# Patient Record
Sex: FEMALE | Race: OTHER | NOT HISPANIC OR LATINO | Employment: OTHER | URBAN - METROPOLITAN AREA
[De-identification: names, ages, dates, MRNs, and addresses within clinical notes are randomized per-mention and may not be internally consistent; named-entity substitution may affect disease eponyms.]

---

## 2024-01-04 PROBLEM — M54.9 CHRONIC BACK PAIN: Status: ACTIVE | Noted: 2024-01-04

## 2024-01-04 PROBLEM — R14.3 FLATUS: Status: RESOLVED | Noted: 2024-01-04 | Resolved: 2024-01-04

## 2024-01-04 PROBLEM — G89.29 CHRONIC BACK PAIN: Status: ACTIVE | Noted: 2024-01-04

## 2024-01-04 PROBLEM — R20.0 NUMBNESS OF FOOT: Status: RESOLVED | Noted: 2023-01-17 | Resolved: 2024-01-04

## 2024-01-04 PROBLEM — F17.219 CIGARETTE NICOTINE DEPENDENCE WITH NICOTINE-INDUCED DISORDER: Status: RESOLVED | Noted: 2024-01-04 | Resolved: 2024-01-04

## 2024-01-04 PROBLEM — M81.0 OSTEOPOROSIS: Status: ACTIVE | Noted: 2022-02-21

## 2024-01-04 PROBLEM — J01.00 ACUTE NON-RECURRENT MAXILLARY SINUSITIS: Status: RESOLVED | Noted: 2024-01-04 | Resolved: 2024-01-04

## 2024-01-04 PROBLEM — K58.9 IRRITABLE BOWEL SYNDROME: Status: ACTIVE | Noted: 2024-01-04

## 2024-01-04 PROBLEM — F41.9 ANXIETY DISORDER: Status: ACTIVE | Noted: 2024-01-04

## 2024-01-04 PROBLEM — M65.30 TRIGGER FINGER OF RIGHT HAND: Status: RESOLVED | Noted: 2024-01-04 | Resolved: 2024-01-04

## 2024-01-04 PROBLEM — M79.672 LEFT FOOT PAIN: Status: RESOLVED | Noted: 2024-01-04 | Resolved: 2024-01-04

## 2024-01-04 PROBLEM — M25.541 ARTHRALGIA OF RIGHT HAND: Status: RESOLVED | Noted: 2024-01-04 | Resolved: 2024-01-04

## 2024-01-04 PROBLEM — G89.29 CHRONIC PAIN: Status: ACTIVE | Noted: 2022-02-09

## 2024-01-04 PROBLEM — R07.89 CHEST WALL PAIN: Status: RESOLVED | Noted: 2024-01-04 | Resolved: 2024-01-04

## 2024-01-04 PROBLEM — R63.0 DECREASED APPETITE: Status: ACTIVE | Noted: 2022-02-09

## 2024-01-04 PROBLEM — F32.A DEPRESSION: Status: ACTIVE | Noted: 2024-01-04

## 2024-01-04 PROBLEM — D49.2 ABNORMAL SKIN GROWTH: Status: RESOLVED | Noted: 2024-01-04 | Resolved: 2024-01-04

## 2024-01-04 PROBLEM — M25.50 JOINT PAIN: Status: ACTIVE | Noted: 2024-01-04

## 2024-01-04 PROBLEM — J31.0 CHRONIC RHINITIS: Status: ACTIVE | Noted: 2024-01-04

## 2024-01-04 PROBLEM — J01.90 ACUTE SINUSITIS: Status: RESOLVED | Noted: 2022-09-27 | Resolved: 2024-01-04

## 2024-01-04 PROBLEM — R53.83 FATIGUE: Status: RESOLVED | Noted: 2022-02-09 | Resolved: 2024-01-04

## 2024-01-04 PROBLEM — E55.9 VITAMIN D DEFICIENCY: Status: ACTIVE | Noted: 2022-02-18

## 2024-01-04 PROBLEM — M32.9 LUPUS (MULTI): Status: ACTIVE | Noted: 2022-02-09

## 2024-01-04 PROBLEM — K57.32 DIVERTICULITIS OF SIGMOID COLON: Status: RESOLVED | Noted: 2024-01-04 | Resolved: 2024-01-04

## 2024-01-04 RX ORDER — TRAMADOL HYDROCHLORIDE 50 MG/1
50 TABLET ORAL EVERY 8 HOURS PRN
COMMUNITY
End: 2024-03-20 | Stop reason: WASHOUT

## 2024-01-04 RX ORDER — GABAPENTIN 300 MG/1
300 CAPSULE ORAL 3 TIMES DAILY
COMMUNITY
End: 2024-03-20 | Stop reason: WASHOUT

## 2024-01-15 ENCOUNTER — APPOINTMENT (OUTPATIENT)
Dept: PAIN MEDICINE | Facility: CLINIC | Age: 69
End: 2024-01-15
Payer: MEDICARE

## 2024-01-24 ENCOUNTER — OFFICE VISIT (OUTPATIENT)
Dept: PAIN MEDICINE | Facility: CLINIC | Age: 69
End: 2024-01-24
Payer: MEDICARE

## 2024-01-24 VITALS
HEART RATE: 99 BPM | RESPIRATION RATE: 22 BRPM | WEIGHT: 140 LBS | DIASTOLIC BLOOD PRESSURE: 68 MMHG | HEIGHT: 62 IN | SYSTOLIC BLOOD PRESSURE: 98 MMHG | BODY MASS INDEX: 25.76 KG/M2

## 2024-01-24 DIAGNOSIS — Z79.899 ENCOUNTER FOR LONG-TERM (CURRENT) USE OF HIGH-RISK MEDICATION: Primary | ICD-10-CM

## 2024-01-24 DIAGNOSIS — M54.16 LUMBAR RADICULOPATHY: ICD-10-CM

## 2024-01-24 PROCEDURE — 1125F AMNT PAIN NOTED PAIN PRSNT: CPT | Performed by: ANESTHESIOLOGY

## 2024-01-24 PROCEDURE — 99204 OFFICE O/P NEW MOD 45 MIN: CPT | Performed by: ANESTHESIOLOGY

## 2024-01-24 PROCEDURE — 99214 OFFICE O/P EST MOD 30 MIN: CPT | Performed by: ANESTHESIOLOGY

## 2024-01-24 PROCEDURE — 1159F MED LIST DOCD IN RCRD: CPT | Performed by: ANESTHESIOLOGY

## 2024-01-24 PROCEDURE — 1036F TOBACCO NON-USER: CPT | Performed by: ANESTHESIOLOGY

## 2024-01-24 PROCEDURE — 82570 ASSAY OF URINE CREATININE: CPT | Mod: 59 | Performed by: ANESTHESIOLOGY

## 2024-01-24 PROCEDURE — 80346 BENZODIAZEPINES1-12: CPT | Mod: MUE | Performed by: ANESTHESIOLOGY

## 2024-01-24 RX ORDER — TRAMADOL HYDROCHLORIDE 50 MG/1
50 TABLET ORAL DAILY PRN
Qty: 30 TABLET | Refills: 1 | Status: SHIPPED | OUTPATIENT
Start: 2024-01-24 | End: 2024-03-20 | Stop reason: SDUPTHER

## 2024-01-24 RX ORDER — PREGABALIN 75 MG/1
75 CAPSULE ORAL 2 TIMES DAILY
Qty: 60 CAPSULE | Refills: 1 | Status: SHIPPED | OUTPATIENT
Start: 2024-01-24 | End: 2024-03-20 | Stop reason: WASHOUT

## 2024-01-24 ASSESSMENT — LIFESTYLE VARIABLES
AUDIT-C TOTAL SCORE: 1
TOTAL SCORE: 0
HOW OFTEN DO YOU HAVE A DRINK CONTAINING ALCOHOL: MONTHLY OR LESS
HOW MANY STANDARD DRINKS CONTAINING ALCOHOL DO YOU HAVE ON A TYPICAL DAY: 1 OR 2
SKIP TO QUESTIONS 9-10: 1
HOW OFTEN DO YOU HAVE SIX OR MORE DRINKS ON ONE OCCASION: NEVER

## 2024-01-24 ASSESSMENT — ENCOUNTER SYMPTOMS
ACTIVITY CHANGE: 1
BACK PAIN: 1

## 2024-01-24 ASSESSMENT — PAIN DESCRIPTION - DESCRIPTORS: DESCRIPTORS: BURNING;NUMBNESS;TINGLING

## 2024-01-24 ASSESSMENT — PAIN - FUNCTIONAL ASSESSMENT: PAIN_FUNCTIONAL_ASSESSMENT: 0-10

## 2024-01-24 ASSESSMENT — PATIENT HEALTH QUESTIONNAIRE - PHQ9
1. LITTLE INTEREST OR PLEASURE IN DOING THINGS: NOT AT ALL
2. FEELING DOWN, DEPRESSED OR HOPELESS: NOT AT ALL
SUM OF ALL RESPONSES TO PHQ9 QUESTIONS 1 & 2: 0

## 2024-01-24 ASSESSMENT — PAIN SCALES - GENERAL
PAINLEVEL_OUTOF10: 8
PAINLEVEL: 8

## 2024-01-24 NOTE — PROGRESS NOTES
Patient is a 68-year-old female with low back and left foot pain.  The pain is predominantly in the left foot.  The pain started after her lumbar fusion surgery from L5 to S1.  The patient has had spinal pain for many years.  The patient underwent a laminectomy by Orlando Jiménez in 2012.  The patient underwent a fusion surgery and a revision in 2022.  The pain is constant.  The pain is worse with activity.  She gets some relief with rest. She has benefited from tramadol in the past.  She requests a refill.  The patient had epidural steroid injections which were ineffective.  These were done by Orlando Jiménez.  We  reviewed the results of the imaging of her lumbar spine.    Review of Systems   Constitutional:  Positive for activity change.   Eyes:  Positive for visual disturbance.   Musculoskeletal:  Positive for back pain and gait problem.   All other systems reviewed and are negative.     GENERAL: alert and appropriate, in no distress, well-hydrated, well nourished, interactive         SKIN: no rash noted         HEAD: normocephalic, no abnormality or lesion noted         EYES: no injection and visual acuity is grossly normal         EARS: external ears normal, no mastoid tenderness         NOSE: external nose normal without rhinorrhea         OROPHARYNX: moist mucus membranes, no tonsillar hypertrophy/exudate, uvula midline and pharynx non-erythematous, lips, teeth and gums are without obvious lesion         NECK: adequate ROM, no cervical LNs noted         RESPIRATORY: breathing non-labored and no grunting/flaring/retractions         CHEST: equal chest rise with normal respiratory effort         ABDOMEN: soft and non-tender         BACK: back normal in appearance, lumbar spine with reduced ROM         EXTREMITIES: strength intact         NEUROLOGIC: gait antalgic, SLR positive, sensation grossly intact    Assessment and Plan    -Chronicity--chronic spinal pain    -Diagnostics--I reviewed the myriad imaging that the  patient brought to the appointment    -Pharmacologic--the patient may benefit from pregabalin.  We discussed the mechanism of action of this medication as well as the side effect profile.  The patient may have a refill of the tramadol.  An opioid agreement was reviewed and signed with the patient. A copy was given to the patient. [place]AR[place] was reviewed and was consistent with the history. A urine or saliva specimen was obtained for toxicology screening. The patient and I discussed the nature of this medication and its side effects. We discussed tolerance, physical dependence, psychological dependence, addiction and opioid-induced hyperalgesia. We discussed the potential need to wean from this medication. We discussed the availability of programs that can help with this process if necessary. We discussed safety issues related to opioids including safe storage. We discussed the fact that the patient should not drive an automobile or operate heavy machinery while taking this medication. A prescription for naloxone was offered to the patient. The patient will be reevaluated for the need to continue opioid therapy in 30-90 days.      -Psychologic--no need for psychologic intervention from my standpoint    -Physical--we discussed the importance of physical therapy and exercise.  We discussed avoidance and modification techniques.    -Intervention--no interventions planned    I spent time educating the patient on the condition including the treatment and the prognosis.  I invited the patient to call at anytime with any questions.

## 2024-01-25 LAB
AMPHETAMINES UR QL SCN: NORMAL
BARBITURATES UR QL SCN: NORMAL
BZE UR QL SCN: NORMAL
CANNABINOIDS UR QL SCN: NORMAL
CREAT UR-MCNC: 88.8 MG/DL (ref 20–320)
PCP UR QL SCN: NORMAL

## 2024-01-28 LAB
1OH-MIDAZOLAM UR CFM-MCNC: <25 NG/ML
6MAM UR CFM-MCNC: <25 NG/ML
7AMINOCLONAZEPAM UR CFM-MCNC: <25 NG/ML
A-OH ALPRAZ UR CFM-MCNC: 70 NG/ML
ALPRAZ UR CFM-MCNC: <25 NG/ML
CHLORDIAZEP UR CFM-MCNC: <25 NG/ML
CLONAZEPAM UR CFM-MCNC: <25 NG/ML
CODEINE UR CFM-MCNC: <50 NG/ML
DIAZEPAM UR CFM-MCNC: <25 NG/ML
EDDP UR CFM-MCNC: <25 NG/ML
FENTANYL UR CFM-MCNC: <2.5 NG/ML
HYDROCODONE CTO UR CFM-MCNC: <25 NG/ML
HYDROMORPHONE UR CFM-MCNC: <25 NG/ML
LORAZEPAM UR CFM-MCNC: <25 NG/ML
METHADONE UR CFM-MCNC: <25 NG/ML
MIDAZOLAM UR CFM-MCNC: <25 NG/ML
MORPHINE UR CFM-MCNC: <50 NG/ML
NORDIAZEPAM UR CFM-MCNC: <25 NG/ML
NORFENTANYL UR CFM-MCNC: <2.5 NG/ML
NORHYDROCODONE UR CFM-MCNC: <25 NG/ML
NOROXYCODONE UR CFM-MCNC: <25 NG/ML
NORTRAMADOL UR-MCNC: <50 NG/ML
OXAZEPAM UR CFM-MCNC: <25 NG/ML
OXYCODONE UR CFM-MCNC: <25 NG/ML
OXYMORPHONE UR CFM-MCNC: <25 NG/ML
TEMAZEPAM UR CFM-MCNC: <25 NG/ML
TRAMADOL UR CFM-MCNC: <50 NG/ML
ZOLPIDEM UR CFM-MCNC: <25 NG/ML
ZOLPIDEM UR-MCNC: <25 NG/ML

## 2024-03-20 ENCOUNTER — OFFICE VISIT (OUTPATIENT)
Dept: PAIN MEDICINE | Facility: CLINIC | Age: 69
End: 2024-03-20
Payer: MEDICARE

## 2024-03-20 VITALS
HEIGHT: 62 IN | SYSTOLIC BLOOD PRESSURE: 106 MMHG | OXYGEN SATURATION: 99 % | BODY MASS INDEX: 25.76 KG/M2 | WEIGHT: 140 LBS | DIASTOLIC BLOOD PRESSURE: 80 MMHG | HEART RATE: 89 BPM

## 2024-03-20 DIAGNOSIS — M54.16 LUMBAR RADICULOPATHY: ICD-10-CM

## 2024-03-20 PROCEDURE — 99214 OFFICE O/P EST MOD 30 MIN: CPT | Performed by: NURSE PRACTITIONER

## 2024-03-20 PROCEDURE — 1159F MED LIST DOCD IN RCRD: CPT | Performed by: NURSE PRACTITIONER

## 2024-03-20 PROCEDURE — 1036F TOBACCO NON-USER: CPT | Performed by: NURSE PRACTITIONER

## 2024-03-20 RX ORDER — TRAMADOL HYDROCHLORIDE 50 MG/1
50 TABLET ORAL EVERY 8 HOURS PRN
Qty: 30 TABLET | Refills: 0 | Status: SHIPPED | OUTPATIENT
Start: 2024-03-20 | End: 2024-03-21 | Stop reason: SDUPTHER

## 2024-03-20 ASSESSMENT — PAIN SCALES - GENERAL
PAINLEVEL_OUTOF10: 5 - MODERATE PAIN
PAINLEVEL: 5

## 2024-03-20 ASSESSMENT — PATIENT HEALTH QUESTIONNAIRE - PHQ9
SUM OF ALL RESPONSES TO PHQ9 QUESTIONS 1 AND 2: 0
2. FEELING DOWN, DEPRESSED OR HOPELESS: NOT AT ALL
1. LITTLE INTEREST OR PLEASURE IN DOING THINGS: NOT AT ALL

## 2024-03-20 ASSESSMENT — PAIN DESCRIPTION - DESCRIPTORS: DESCRIPTORS: ACHING

## 2024-03-20 ASSESSMENT — PAIN - FUNCTIONAL ASSESSMENT: PAIN_FUNCTIONAL_ASSESSMENT: 0-10

## 2024-03-20 NOTE — PROGRESS NOTES
Subjective   Patient ID: Diamond Diaz is a 68 y.o. female who presents for Back Pain (Left foot).    HPI 68-year-old female with past medical history significant for osteoporosis, SLE, polyarthralgia, IBS, depression, chronic back pain, anxiety disorder low back and left foot pain. She reports the pain started after her L5-S1 lumbar fusion surgery. She also underwent a laminectomy in 2012. She reports a fusion surgery and a revision in 2022.  She reports that she has had multiple epidural steroid injections that were ineffective as well as other treatment modalities.  She saw Dr. Kirstin parsons in January who provided her with a prescription for Pregabalin and Tramadol. She reports that she took Pregabalin for 4-5 days. She reports excessive thirst, confusion, impaired balance and constipation. She could not tolerate these symptoms and therefore stopped. She reports the Tramadol does help but the once daily dosing is not helping much. She has to pick and choose what half of the day she is miserable and which half of the days she has tolerable pain.  She is quite tearful when conversing with me.  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  She reports that she used to ride motorcycles and be very active outside with chopping wood and other things that really made her happy and ever since she had her back surgery she has been significantly limited in her physical ability.  She reports that she has a dog she loves and wants to take on frequent walks and she does push herself to do so but then she is miserable afterward.    Review of Systems Unless noted in the HPI all other systems have been reviewed and are negative for complaint    Objective   Physical Exam  General- No acute distress, well appearing and well nourished.   Eyes Conjunctiva and lids: No erythema, swelling or discharge  Neck - Supple, no cervical  lymphadenopathy.   Pulmonary - Respiratory effort: Normal respiration.   Cardiovascular - Normal rate and rhythm.  Examination of extremities for edema and/or varicosities: No peripheral edema  Abdomen: Soft, Non-tender, non-distended, no abdominal masses.   Musculoskeletal - Lumbar spine with reduced ROM. LLE with significantly decreased ROM.    Skin - Skin and subcutaneous tissue: Normal without rashes or lesions.  Neurologic -Severely antalgic gait; has to stop every 4-5 steps. SLR positive. Hypersensitivity to the LLE  Psychiatric - Orientation to person, place, and time: Normal. Mood and affect: Normal.    Assessment/Plan   Problem List Items Addressed This Visit    None  Visit Diagnoses       Lumbar radiculopathy        Relevant Medications    traMADol (Ultram) 50 mg tablet          TREATMENT PLAN:  I had a nice discussion with the patient today and our plan will be as follows:  Radiology: No new imaging to review at this time.   Physically: Encouraged patient to continue with increased physical activity as able.   Psychologically: No acute psychological needs at this time.    Medication: Discussed with collaborating physician Dr. Fulton and will increase patient's Tramadol to 50mg 3 times daily, as needed.  I will refill the patient's opioids today for [ 1 ] month. The patient continues to see benefit and improvement in their quality of life and ability to maintain ADLs. Patient educated about the risks of taking opioids and operating a motor vehicle. Patient reports no adverse side effects to current medication regimen.  Current regimen does allow patient to maintain ADLs.  Patient reports no new neurologic symptoms, new pain areas, or exacerbation in pain today.  Patient reports they are happy with current treatment care path.  Patient has been educated on the risks, benefits, and alternatives of controlled substances as well as the proper way to store these medications. The patient and I discussed the  nature of this medication and its side effects.  We discussed tolerance, physical dependence, psychological dependence, addiction and opioid-induced hyperalgesia.  We discussed the potential need to wean from this medication.  We discussed the availability of programs that can help with this process if necessary.  We discussed safety issues related to opioids including safe storage.  We discussed the fact that the patient should not drive an automobile or operate heavy machinery while taking this medication.  A prescription for naloxone was offered to the patient.  The patient will be re-evaluated for the need to continue opioid therapy in 60-90 days.  A PDMP report was reviewed today and was consistent with reported prescribing.  Toxicology screening results were reviewed.  Patient did not bring pills with her today for her appointment.  Informed patient that bringing her pills to the next appointment would be appreciated.  Duration: Chronic/ongoing.  Intervention: Will see patient back in 1 month to reassess change in medication dosage/frequency.

## 2024-03-21 ENCOUNTER — TELEPHONE (OUTPATIENT)
Dept: PAIN MEDICINE | Facility: CLINIC | Age: 69
End: 2024-03-21
Payer: MEDICARE

## 2024-03-21 DIAGNOSIS — M54.16 LUMBAR RADICULOPATHY: ICD-10-CM

## 2024-03-21 RX ORDER — TRAMADOL HYDROCHLORIDE 50 MG/1
50 TABLET ORAL EVERY 8 HOURS PRN
Qty: 90 TABLET | Refills: 0 | Status: SHIPPED | OUTPATIENT
Start: 2024-03-29 | End: 2024-04-23 | Stop reason: SDUPTHER

## 2024-04-20 ENCOUNTER — LAB REQUISITION (OUTPATIENT)
Dept: LAB | Facility: HOSPITAL | Age: 69
End: 2024-04-20
Payer: MEDICARE

## 2024-04-20 DIAGNOSIS — R30.0 DYSURIA: ICD-10-CM

## 2024-04-20 PROCEDURE — 87086 URINE CULTURE/COLONY COUNT: CPT

## 2024-04-22 LAB — BACTERIA UR CULT: NORMAL

## 2024-04-23 ENCOUNTER — OFFICE VISIT (OUTPATIENT)
Dept: PAIN MEDICINE | Facility: CLINIC | Age: 69
End: 2024-04-23
Payer: MEDICARE

## 2024-04-23 VITALS
HEIGHT: 62 IN | HEART RATE: 90 BPM | SYSTOLIC BLOOD PRESSURE: 100 MMHG | OXYGEN SATURATION: 95 % | WEIGHT: 140 LBS | DIASTOLIC BLOOD PRESSURE: 70 MMHG | BODY MASS INDEX: 25.76 KG/M2

## 2024-04-23 DIAGNOSIS — M54.16 LUMBAR RADICULOPATHY: Primary | ICD-10-CM

## 2024-04-23 PROCEDURE — 1036F TOBACCO NON-USER: CPT | Performed by: NURSE PRACTITIONER

## 2024-04-23 PROCEDURE — 99214 OFFICE O/P EST MOD 30 MIN: CPT | Performed by: NURSE PRACTITIONER

## 2024-04-23 PROCEDURE — 1125F AMNT PAIN NOTED PAIN PRSNT: CPT | Performed by: NURSE PRACTITIONER

## 2024-04-23 PROCEDURE — 1159F MED LIST DOCD IN RCRD: CPT | Performed by: NURSE PRACTITIONER

## 2024-04-23 RX ORDER — TRAMADOL HYDROCHLORIDE 50 MG/1
50 TABLET ORAL EVERY 8 HOURS PRN
Qty: 90 TABLET | Refills: 1 | Status: SHIPPED | OUTPATIENT
Start: 2024-05-02 | End: 2024-05-23 | Stop reason: SDUPTHER

## 2024-04-23 ASSESSMENT — PAIN - FUNCTIONAL ASSESSMENT: PAIN_FUNCTIONAL_ASSESSMENT: 0-10

## 2024-04-23 ASSESSMENT — PAIN SCALES - GENERAL
PAINLEVEL: 2
PAINLEVEL_OUTOF10: 2

## 2024-04-23 ASSESSMENT — PATIENT HEALTH QUESTIONNAIRE - PHQ9
2. FEELING DOWN, DEPRESSED OR HOPELESS: NOT AT ALL
SUM OF ALL RESPONSES TO PHQ9 QUESTIONS 1 AND 2: 0
1. LITTLE INTEREST OR PLEASURE IN DOING THINGS: NOT AT ALL

## 2024-04-23 ASSESSMENT — PAIN DESCRIPTION - DESCRIPTORS: DESCRIPTORS: ACHING

## 2024-04-23 NOTE — PROGRESS NOTES
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

## 2024-04-23 NOTE — PROGRESS NOTES
Subjective   Patient ID: Diamond Diaz is a 68 y.o. female who presents for Pain Management.    HPI 68-year-old female with past medical history significant for osteoporosis, SLE, polyarthralgia, IBS, depression, chronic back pain, anxiety disorder low back and left foot pain. She reports the pain started after her L5-S1 lumbar fusion surgery. She also underwent a laminectomy in 2012. She reports a fusion surgery and a revision in 2022.  She reports that she has had multiple epidural steroid injections that were ineffective as well as other treatment modalities. Diamond presents for a Pain Management follow-up and Medication refill. Last visit, we increased her Tramadol to 50mg TID, PRN as her quality of life with previous dosing was unacceptable. Today, she reports she has better been able to keep up with her ADLs as well as have some improved quality of life.     Review of Systems Unless noted in the HPI all other systems have been reviewed and are negative for complaint.     Objective   Physical Exam  General- No acute distress, well appearing and well nourished.   Eyes Conjunctiva and lids: No erythema, swelling or discharge  Neck - Supple, no cervical lymphadenopathy.   Pulmonary - Respiratory effort: Normal respiration.   Cardiovascular - Normal rate and rhythm.  Examination of extremities for edema and/or varicosities: No peripheral edema  Abdomen: Soft, Non-tender, non-distended, no abdominal masses.   Musculoskeletal - Lumbar spine with reduced ROM. LLE with significantly decreased ROM.    Skin - Skin and subcutaneous tissue: Normal without rashes or lesions.  Neurologic -Severely antalgic gait; has to stop every 4-5 steps. SLR positive. Hypersensitivity to the LLE  Psychiatric - Orientation to person, place, and time: Normal. Mood and affect: Normal.    Assessment/Plan   Problem List Items Addressed This Visit       Lumbar radiculopathy - Primary    Relevant Medications    traMADol (Ultram) 50 mg tablet  (Start on 5/2/2024)     TREATMENT PLAN:  I had a nice discussion with the patient today and our plan will be as follows:  Radiology: No new imaging to review at this time.   Physically: Encouraged patient to continue with increased physical activity as able.   Psychologically: No acute psychological needs at this time.    Medication: I will refill the patient's opioids today for [ 3 ] months. The patient continues to see benefit and improvement in their quality of life and ability to maintain ADLs. Patient educated about the risks of taking opioids and operating a motor vehicle. Patient reports no adverse side effects to current medication regimen.  Current regimen does allow patient to maintain ADLs.  Patient reports no new neurologic symptoms, new pain areas, or exacerbation in pain today.  Patient reports they are happy with current treatment care path. Patient has been educated on the risks, benefits, and alternatives of controlled substances as well as the proper way to store these medications. The patient and I discussed the nature of this medication and its side effects.  We discussed tolerance, physical dependence, psychological dependence, addiction and opioid-induced hyperalgesia.  We discussed the potential need to wean from this medication.  We discussed the availability of programs that can help with this process if necessary.  We discussed safety issues related to opioids including safe storage.  We discussed the fact that the patient should not drive an automobile or operate heavy machinery while taking this medication.  A prescription for naloxone was offered to the patient.  The patient will be re-evaluated for the need to continue opioid therapy in 60-90 days.  A PDMP report was reviewed today and was consistent with reported prescribing.  Tox screen is up-to-date and consistent with prescribing history.  Duration: Chronic/ongoing.    Intervention: Nothing at this time.

## 2024-05-22 DIAGNOSIS — M54.16 LUMBAR RADICULOPATHY: ICD-10-CM

## 2024-05-23 RX ORDER — TRAMADOL HYDROCHLORIDE 50 MG/1
50 TABLET ORAL EVERY 8 HOURS PRN
Qty: 90 TABLET | Refills: 1 | Status: SHIPPED | OUTPATIENT
Start: 2024-05-23 | End: 2024-06-22

## 2024-05-23 NOTE — TELEPHONE ENCOUNTER
Need to resend new Tramadol prescription.. Script that was written dated 4/23/24, start date 5/2/24 was not picked up at the pharmacy.

## 2024-05-31 ENCOUNTER — TELEPHONE (OUTPATIENT)
Dept: OBSTETRICS AND GYNECOLOGY | Facility: CLINIC | Age: 69
End: 2024-05-31
Payer: MEDICARE

## 2024-05-31 NOTE — TELEPHONE ENCOUNTER
NP calling with c/o urinary pressure and frequency w/ hematuria but negative for UTI. Pt was seen by urgent care, ED and urologist. Pt states she feels constant pressure on her bladder and burning. Appt made for 06/05.

## 2024-06-03 ENCOUNTER — OFFICE VISIT (OUTPATIENT)
Dept: PRIMARY CARE | Facility: CLINIC | Age: 69
End: 2024-06-03
Payer: MEDICARE

## 2024-06-03 VITALS
BODY MASS INDEX: 23.19 KG/M2 | OXYGEN SATURATION: 97 % | HEART RATE: 78 BPM | WEIGHT: 126 LBS | SYSTOLIC BLOOD PRESSURE: 106 MMHG | HEIGHT: 62 IN | DIASTOLIC BLOOD PRESSURE: 74 MMHG

## 2024-06-03 DIAGNOSIS — E55.9 VITAMIN D DEFICIENCY: ICD-10-CM

## 2024-06-03 DIAGNOSIS — F32.A DEPRESSION, UNSPECIFIED DEPRESSION TYPE: ICD-10-CM

## 2024-06-03 DIAGNOSIS — F41.9 ANXIETY DISORDER, UNSPECIFIED TYPE: ICD-10-CM

## 2024-06-03 DIAGNOSIS — Z12.31 ENCOUNTER FOR SCREENING MAMMOGRAM FOR BREAST CANCER: ICD-10-CM

## 2024-06-03 DIAGNOSIS — E78.00 SERUM CHOLESTEROL ELEVATED: ICD-10-CM

## 2024-06-03 DIAGNOSIS — M81.0 OSTEOPOROSIS, UNSPECIFIED OSTEOPOROSIS TYPE, UNSPECIFIED PATHOLOGICAL FRACTURE PRESENCE: ICD-10-CM

## 2024-06-03 DIAGNOSIS — R31.29 MICROSCOPIC HEMATURIA: ICD-10-CM

## 2024-06-03 DIAGNOSIS — B37.31 YEAST VAGINITIS: ICD-10-CM

## 2024-06-03 DIAGNOSIS — Z87.891 FORMER SMOKER: ICD-10-CM

## 2024-06-03 DIAGNOSIS — Z00.00 ROUTINE GENERAL MEDICAL EXAMINATION AT HEALTH CARE FACILITY: Primary | ICD-10-CM

## 2024-06-03 PROCEDURE — 1123F ACP DISCUSS/DSCN MKR DOCD: CPT | Performed by: PHYSICIAN ASSISTANT

## 2024-06-03 PROCEDURE — 1158F ADVNC CARE PLAN TLK DOCD: CPT | Performed by: PHYSICIAN ASSISTANT

## 2024-06-03 PROCEDURE — 1160F RVW MEDS BY RX/DR IN RCRD: CPT | Performed by: PHYSICIAN ASSISTANT

## 2024-06-03 PROCEDURE — 1159F MED LIST DOCD IN RCRD: CPT | Performed by: PHYSICIAN ASSISTANT

## 2024-06-03 PROCEDURE — G0439 PPPS, SUBSEQ VISIT: HCPCS | Performed by: PHYSICIAN ASSISTANT

## 2024-06-03 PROCEDURE — 1170F FXNL STATUS ASSESSED: CPT | Performed by: PHYSICIAN ASSISTANT

## 2024-06-03 RX ORDER — CITALOPRAM 10 MG/1
10 TABLET ORAL DAILY
Qty: 30 TABLET | Refills: 1 | Status: SHIPPED | OUTPATIENT
Start: 2024-06-03 | End: 2024-08-02

## 2024-06-03 RX ORDER — FLUCONAZOLE 150 MG/1
150 TABLET ORAL ONCE
Qty: 1 TABLET | Refills: 0 | Status: SHIPPED | OUTPATIENT
Start: 2024-06-03 | End: 2024-06-03

## 2024-06-03 ASSESSMENT — ENCOUNTER SYMPTOMS
DIARRHEA: 0
ABDOMINAL PAIN: 0
WHEEZING: 0
BLOOD IN STOOL: 0
CHEST TIGHTNESS: 0
ACTIVITY CHANGE: 0
FREQUENCY: 1
APPETITE CHANGE: 0
PALPITATIONS: 0
DIZZINESS: 0
CONSTIPATION: 0
SLEEP DISTURBANCE: 0
LIGHT-HEADEDNESS: 0
ARTHRALGIAS: 0
TREMORS: 0
SHORTNESS OF BREATH: 0
COLOR CHANGE: 0
MYALGIAS: 0
NAUSEA: 0

## 2024-06-03 ASSESSMENT — ACTIVITIES OF DAILY LIVING (ADL)
DRESSING: INDEPENDENT
DOING_HOUSEWORK: INDEPENDENT
BATHING: INDEPENDENT
MANAGING_FINANCES: INDEPENDENT
TAKING_MEDICATION: INDEPENDENT
GROCERY_SHOPPING: INDEPENDENT

## 2024-06-03 ASSESSMENT — PATIENT HEALTH QUESTIONNAIRE - PHQ9
1. LITTLE INTEREST OR PLEASURE IN DOING THINGS: SEVERAL DAYS
10. IF YOU CHECKED OFF ANY PROBLEMS, HOW DIFFICULT HAVE THESE PROBLEMS MADE IT FOR YOU TO DO YOUR WORK, TAKE CARE OF THINGS AT HOME, OR GET ALONG WITH OTHER PEOPLE: SOMEWHAT DIFFICULT
SUM OF ALL RESPONSES TO PHQ9 QUESTIONS 1 AND 2: 2
2. FEELING DOWN, DEPRESSED OR HOPELESS: SEVERAL DAYS

## 2024-06-03 NOTE — PROGRESS NOTES
"Subjective   Reason for Visit: Diamond Diaz is an 68 y.o. female here for a Medicare Wellness visit.     Past Medical, Surgical, and Family History reviewed and updated in chart.         Urinary Frequency   This is a chronic problem. The current episode started more than 1 month ago. The problem has been waxing and waning. The pain is mild. There has been no fever. Associated symptoms include frequency. Pertinent negatives include no nausea or urgency.   Depression  Visit Type: follow-up  Patient presents with the following symptoms: nervousness/anxiety.  Patient is not experiencing: irritability, nausea, palpitations, shortness of breath, suicidal ideas and suicidal planning.          Patient Care Team:  Bisi Carlos PA-C as PCP - General (Family Medicine)     Review of Systems   Constitutional:  Negative for activity change, appetite change and irritability.   HENT:  Negative for dental problem.    Eyes:  Negative for visual disturbance.   Respiratory:  Negative for chest tightness, shortness of breath and wheezing.    Cardiovascular:  Negative for chest pain, palpitations and leg swelling.   Gastrointestinal:  Negative for abdominal pain, blood in stool, constipation, diarrhea and nausea.   Endocrine: Negative for cold intolerance and heat intolerance.   Genitourinary:  Positive for frequency. Negative for urgency.   Musculoskeletal:  Negative for arthralgias and myalgias.   Skin:  Negative for color change.   Allergic/Immunologic: Negative for immunocompromised state.   Neurological:  Negative for dizziness, tremors and light-headedness.   Psychiatric/Behavioral:  Positive for depression. Negative for behavioral problems, sleep disturbance and suicidal ideas. The patient is nervous/anxious.        Objective   Vitals:  /74   Pulse 78   Ht 1.575 m (5' 2\")   Wt 57.2 kg (126 lb)   SpO2 97%   BMI 23.05 kg/m²       Physical Exam  HENT:      Right Ear: Tympanic membrane normal.      Left Ear: " Tympanic membrane normal.      Nose: Nose normal.      Mouth/Throat:      Mouth: Mucous membranes are moist.   Cardiovascular:      Rate and Rhythm: Normal rate and regular rhythm.      Pulses: Normal pulses.   Pulmonary:      Effort: Pulmonary effort is normal. No respiratory distress.   Abdominal:      General: Bowel sounds are normal.      Tenderness: There is no abdominal tenderness.   Genitourinary:     Labia:         Right: No rash.         Left: No rash.       Urethra: No prolapse.      Vagina: Vaginal discharge and erythema present. No bleeding.   Musculoskeletal:      Cervical back: Normal range of motion. No tenderness.      Right lower leg: No edema.      Left lower leg: No edema.   Skin:     General: Skin is warm.   Neurological:      General: No focal deficit present.      Mental Status: She is alert. Mental status is at baseline.   Psychiatric:         Mood and Affect: Mood normal.         Thought Content: Thought content normal.         Judgment: Judgment normal.         Assessment/Plan   Problem List Items Addressed This Visit       Vitamin D deficiency    Relevant Orders    Vitamin D 25-Hydroxy,Total (for eval of Vitamin D levels)    Osteoporosis    Relevant Orders    XR DEXA bone density    Depression    Anxiety disorder    Relevant Medications    citalopram (CeleXA) 10 mg tablet     Other Visit Diagnoses       Routine general medical examination at health care facility    -  Primary    Former smoker        Relevant Orders    CT lung screening low dose    Serum cholesterol elevated        Relevant Orders    Lipid Panel    TSH with reflex to Free T4 if abnormal    Encounter for screening mammogram for breast cancer        Relevant Orders    BI mammo bilateral screening tomosynthesis    Microscopic hematuria        Yeast vaginitis            Will treat for yeast. Discussed taking probiotic and using lubrication daily as well.

## 2024-06-04 ASSESSMENT — ENCOUNTER SYMPTOMS
NERVOUS/ANXIOUS: 1
DEPRESSION: 1
IRRITABILITY: 0

## 2024-06-05 ENCOUNTER — OFFICE VISIT (OUTPATIENT)
Dept: OBSTETRICS AND GYNECOLOGY | Facility: CLINIC | Age: 69
End: 2024-06-05
Payer: MEDICARE

## 2024-06-05 VITALS — DIASTOLIC BLOOD PRESSURE: 78 MMHG | WEIGHT: 127.4 LBS | SYSTOLIC BLOOD PRESSURE: 100 MMHG | BODY MASS INDEX: 23.3 KG/M2

## 2024-06-05 DIAGNOSIS — N95.2 ATROPHIC VAGINITIS: Primary | ICD-10-CM

## 2024-06-05 DIAGNOSIS — N76.0 VULVOVAGINITIS: ICD-10-CM

## 2024-06-05 DIAGNOSIS — R39.89 SENSATION OF PRESSURE IN BLADDER AREA: ICD-10-CM

## 2024-06-05 LAB
POC APPEARANCE, URINE: CLEAR
POC BILIRUBIN, URINE: NEGATIVE
POC BLOOD, URINE: NEGATIVE
POC COLOR, URINE: YELLOW
POC GLUCOSE, URINE: NEGATIVE MG/DL
POC KETONES, URINE: NEGATIVE MG/DL
POC LEUKOCYTES, URINE: NEGATIVE
POC NITRITE,URINE: NEGATIVE
POC PH, URINE: 5.5 PH
POC PROTEIN, URINE: NEGATIVE MG/DL
POC SPECIFIC GRAVITY, URINE: <=1.005
POC UROBILINOGEN, URINE: 0.2 EU/DL

## 2024-06-05 PROCEDURE — 81003 URINALYSIS AUTO W/O SCOPE: CPT | Performed by: OBSTETRICS & GYNECOLOGY

## 2024-06-05 PROCEDURE — 1159F MED LIST DOCD IN RCRD: CPT | Performed by: OBSTETRICS & GYNECOLOGY

## 2024-06-05 PROCEDURE — 87205 SMEAR GRAM STAIN: CPT | Mod: WESLAB | Performed by: OBSTETRICS & GYNECOLOGY

## 2024-06-05 PROCEDURE — 1123F ACP DISCUSS/DSCN MKR DOCD: CPT | Performed by: OBSTETRICS & GYNECOLOGY

## 2024-06-05 PROCEDURE — 1125F AMNT PAIN NOTED PAIN PRSNT: CPT | Performed by: OBSTETRICS & GYNECOLOGY

## 2024-06-05 PROCEDURE — 99204 OFFICE O/P NEW MOD 45 MIN: CPT | Performed by: OBSTETRICS & GYNECOLOGY

## 2024-06-05 RX ORDER — NYSTATIN 100000 U/G
OINTMENT TOPICAL 2 TIMES DAILY
Qty: 30 G | Refills: 0 | Status: SHIPPED | OUTPATIENT
Start: 2024-06-05 | End: 2025-06-05

## 2024-06-05 RX ORDER — TRIAMCINOLONE ACETONIDE 1 MG/G
OINTMENT TOPICAL 2 TIMES DAILY
Qty: 30 G | Refills: 0 | Status: SHIPPED | OUTPATIENT
Start: 2024-06-05

## 2024-06-05 RX ORDER — METRONIDAZOLE 7.5 MG/G
GEL VAGINAL DAILY
Qty: 70 G | Refills: 0 | Status: SHIPPED | OUTPATIENT
Start: 2024-06-05 | End: 2024-06-10

## 2024-06-05 ASSESSMENT — LIFESTYLE VARIABLES
HOW MANY STANDARD DRINKS CONTAINING ALCOHOL DO YOU HAVE ON A TYPICAL DAY: 1 OR 2
HOW OFTEN DO YOU HAVE A DRINK CONTAINING ALCOHOL: MONTHLY OR LESS
SKIP TO QUESTIONS 9-10: 0
AUDIT-C TOTAL SCORE: 2
HOW OFTEN DO YOU HAVE SIX OR MORE DRINKS ON ONE OCCASION: LESS THAN MONTHLY

## 2024-06-05 ASSESSMENT — PATIENT HEALTH QUESTIONNAIRE - PHQ9
SUM OF ALL RESPONSES TO PHQ9 QUESTIONS 1 & 2: 0
SUM OF ALL RESPONSES TO PHQ9 QUESTIONS 1 & 2: 0
2. FEELING DOWN, DEPRESSED OR HOPELESS: NOT AT ALL
1. LITTLE INTEREST OR PLEASURE IN DOING THINGS: NOT AT ALL
2. FEELING DOWN, DEPRESSED OR HOPELESS: NOT AT ALL
1. LITTLE INTEREST OR PLEASURE IN DOING THINGS: NOT AT ALL

## 2024-06-05 ASSESSMENT — PAIN SCALES - GENERAL: PAINLEVEL: 2

## 2024-06-05 ASSESSMENT — ENCOUNTER SYMPTOMS
OCCASIONAL FEELINGS OF UNSTEADINESS: 0
LOSS OF SENSATION IN FEET: 0
DEPRESSION: 0

## 2024-06-05 NOTE — PROGRESS NOTES
Annual-Hysterectomy  Subjective   Diamond Diaz is a 68 y.o. female who is here for a routine exam.   Complaints:   increased pressure/pain in vaginal vault, increased feelings like she had to go to restroom, +microscopic blood noted, tx'd w/macrobid and keflex, w/o relief; burning after voiding; monistat didn't help; diflucan helped some, but not gone; had 1 delivery 7lbs; had vaginal hysterectomy done 97 due to bleeding, was told to get paps after removal but has not, not due to abnl cells, never had abnl paps prior and tissue was normal at removal per pt  Periods: none    History of abnormal Pap smear: no  History of abnormal mammogram: no      OB History          1    Para   1    Term   1       0    AB   0    Living   1         SAB   0    IAB   0    Ectopic   0    Multiple   0    Live Births   1                  Review of Systems   Genitourinary:  Positive for dysuria, pelvic pain and vaginal discharge.         Objective   /78   Wt 57.8 kg (127 lb 6.4 oz)   BMI 23.30 kg/m²        General:   Alert and oriented, in no acute distress   Neck: Supple. No visible thyromegaly.    Breast/Axilla: Normal to palpation bilaterally without masses, skin changes, or nipple discharge.    Abdomen: Soft, non-tender, without masses or organomegaly   Vulva: Normal architecture without erythema, masses, or lesions.    Vagina: Normal mucosa without lesions, masses; + atrophy. white vaginal discharge.    Cervix: absent   Uterus: absent   Adnexa: Normal without masses or lesions   Pelvic Floor Grade 1-2 cystocele noted. No high tone pelvic floor    Psych Normal affect. Normal mood.      Assessment/Plan   Problem List Items Addressed This Visit             ICD-10-CM    Sensation of pressure in bladder area R39.89    Relevant Orders    POCT UA Automated manually resulted (Completed)     Other Visit Diagnoses         Codes    Atrophic vaginitis    -  Primary N95.2    Relevant Medications    metroNIDAZOLE  (Metrogel) 0.75 % (37.5mg/5 gram) vaginal gel    Other Relevant Orders    Vaginitis Gram Stain For Bacterial Vaginosis + Yeast    Vulvovaginitis     N76.0    Relevant Medications    nystatin (Mycostatin) ointment    triamcinolone (Kenalog) 0.1 % ointment    Other Relevant Orders    Vaginitis Gram Stain For Bacterial Vaginosis + Yeast                Nathanael Altamirano MD

## 2024-06-07 LAB
BACTERIAL VAGINOSIS VAG-IMP: NORMAL
CLUE CELLS VAG LPF-#/AREA: NORMAL /[LPF]
NUGENT SCORE: 4
YEAST VAG WET PREP-#/AREA: NORMAL

## 2024-06-07 ASSESSMENT — ENCOUNTER SYMPTOMS: DYSURIA: 1

## 2024-06-21 ENCOUNTER — LAB (OUTPATIENT)
Dept: LAB | Facility: LAB | Age: 69
End: 2024-06-21
Payer: MEDICARE

## 2024-06-21 DIAGNOSIS — E55.9 VITAMIN D DEFICIENCY: ICD-10-CM

## 2024-06-21 DIAGNOSIS — E78.00 SERUM CHOLESTEROL ELEVATED: ICD-10-CM

## 2024-06-21 LAB
25(OH)D3 SERPL-MCNC: 29 NG/ML (ref 30–100)
CHOLEST SERPL-MCNC: 248 MG/DL (ref 0–199)
CHOLESTEROL/HDL RATIO: 4.3
HDLC SERPL-MCNC: 58 MG/DL
LDLC SERPL CALC-MCNC: 160 MG/DL
NON HDL CHOLESTEROL: 190 MG/DL (ref 0–149)
T4 FREE SERPL-MCNC: 1.2 NG/DL (ref 0.78–1.48)
TRIGL SERPL-MCNC: 149 MG/DL (ref 0–149)
TSH SERPL-ACNC: 7.43 MIU/L (ref 0.44–3.98)
VLDL: 30 MG/DL (ref 0–40)

## 2024-06-21 PROCEDURE — 84443 ASSAY THYROID STIM HORMONE: CPT

## 2024-06-21 PROCEDURE — 84439 ASSAY OF FREE THYROXINE: CPT

## 2024-06-21 PROCEDURE — 80061 LIPID PANEL: CPT

## 2024-06-21 PROCEDURE — 82306 VITAMIN D 25 HYDROXY: CPT

## 2024-06-21 PROCEDURE — 36415 COLL VENOUS BLD VENIPUNCTURE: CPT

## 2024-06-27 ENCOUNTER — HOSPITAL ENCOUNTER (OUTPATIENT)
Dept: RADIOLOGY | Facility: CLINIC | Age: 69
Discharge: HOME | End: 2024-06-27
Payer: MEDICARE

## 2024-06-27 ENCOUNTER — APPOINTMENT (OUTPATIENT)
Dept: RADIOLOGY | Facility: CLINIC | Age: 69
End: 2024-06-27
Payer: MEDICARE

## 2024-06-27 VITALS — WEIGHT: 130 LBS | BODY MASS INDEX: 23.92 KG/M2 | HEIGHT: 62 IN

## 2024-06-27 DIAGNOSIS — Z87.891 FORMER SMOKER: ICD-10-CM

## 2024-06-27 DIAGNOSIS — Z12.31 ENCOUNTER FOR SCREENING MAMMOGRAM FOR BREAST CANCER: ICD-10-CM

## 2024-06-27 PROCEDURE — 71271 CT THORAX LUNG CANCER SCR C-: CPT

## 2024-06-27 PROCEDURE — 77067 SCR MAMMO BI INCL CAD: CPT

## 2024-06-28 ENCOUNTER — OFFICE VISIT (OUTPATIENT)
Dept: PRIMARY CARE | Facility: CLINIC | Age: 69
End: 2024-06-28
Payer: MEDICARE

## 2024-06-28 VITALS
HEART RATE: 86 BPM | DIASTOLIC BLOOD PRESSURE: 70 MMHG | BODY MASS INDEX: 23.78 KG/M2 | WEIGHT: 130 LBS | OXYGEN SATURATION: 98 % | SYSTOLIC BLOOD PRESSURE: 108 MMHG

## 2024-06-28 DIAGNOSIS — N30.10 INTERSTITIAL CYSTITIS: ICD-10-CM

## 2024-06-28 DIAGNOSIS — R31.29 MICROSCOPIC HEMATURIA: ICD-10-CM

## 2024-06-28 DIAGNOSIS — E03.9 ACQUIRED HYPOTHYROIDISM: Primary | ICD-10-CM

## 2024-06-28 PROCEDURE — 1159F MED LIST DOCD IN RCRD: CPT | Performed by: PHYSICIAN ASSISTANT

## 2024-06-28 PROCEDURE — 1123F ACP DISCUSS/DSCN MKR DOCD: CPT | Performed by: PHYSICIAN ASSISTANT

## 2024-06-28 PROCEDURE — 1126F AMNT PAIN NOTED NONE PRSNT: CPT | Performed by: PHYSICIAN ASSISTANT

## 2024-06-28 PROCEDURE — 99213 OFFICE O/P EST LOW 20 MIN: CPT | Performed by: PHYSICIAN ASSISTANT

## 2024-06-28 PROCEDURE — 1160F RVW MEDS BY RX/DR IN RCRD: CPT | Performed by: PHYSICIAN ASSISTANT

## 2024-06-28 RX ORDER — LEVOTHYROXINE SODIUM 50 UG/1
50 TABLET ORAL DAILY
Qty: 30 TABLET | Refills: 2 | Status: SHIPPED | OUTPATIENT
Start: 2024-06-28 | End: 2024-09-26

## 2024-06-28 ASSESSMENT — ENCOUNTER SYMPTOMS
FEVER: 0
DYSURIA: 1
CHILLS: 0
HEMATURIA: 0

## 2024-06-28 ASSESSMENT — PAIN SCALES - GENERAL: PAINLEVEL: 0-NO PAIN

## 2024-06-28 NOTE — PROGRESS NOTES
Subjective   Patient ID: Diamond Diaz is a 68 y.o. female who presents for Follow-up (labs).    HPI   Pt is here for follow up of her medications and CT scan.. will have DEXA done next week.  Having more OAB issues and rectal irritation.  Has an appt with Dr. Baker in 6 weeks for bladder issue. May have interstitial cystitis.  Did see Gyn And was tested for several vaginal infections treated for a yeast.  Patient states she still has pain but it sounds more bladder related.  Review of Systems   Constitutional:  Negative for chills and fever.   Genitourinary:  Positive for dysuria, pelvic pain, urgency and vaginal pain. Negative for hematuria and vaginal discharge.       Objective   /70   Pulse 86   Wt 59 kg (130 lb)   SpO2 98%   BMI 23.78 kg/m²     Physical Exam  Neurological:      General: No focal deficit present.      Mental Status: She is alert. Mental status is at baseline.   Psychiatric:         Mood and Affect: Mood normal.         Behavior: Behavior normal.         Judgment: Judgment normal.         Assessment/Plan   Diagnoses and all orders for this visit:  Acquired hypothyroidism  -     Synthroid 50 mcg tablet; Take 1 tablet (50 mcg) by mouth early in the morning.. Take on an empty stomach at the same time each day, either 30 to 60 minutes prior to breakfast  -     TSH with reflex to Free T4 if abnormal; Future  Microscopic hematuria  Interstitial cystitis    Jerrell TSH which was high indicating hypothyroidism.  Will start her on Synthroid.  Risk and benefits of medication discussed.  Recheck level in about 2 months.  She will also follow-up with Dr. Mar regarding her possible interstitial cystitis.

## 2024-07-01 ENCOUNTER — HOSPITAL ENCOUNTER (OUTPATIENT)
Dept: RADIOLOGY | Facility: CLINIC | Age: 69
Discharge: HOME | End: 2024-07-01
Payer: MEDICARE

## 2024-07-01 DIAGNOSIS — M81.0 OSTEOPOROSIS, UNSPECIFIED OSTEOPOROSIS TYPE, UNSPECIFIED PATHOLOGICAL FRACTURE PRESENCE: ICD-10-CM

## 2024-07-01 PROCEDURE — 77080 DXA BONE DENSITY AXIAL: CPT

## 2024-07-15 ENCOUNTER — OFFICE VISIT (OUTPATIENT)
Dept: OBSTETRICS AND GYNECOLOGY | Facility: CLINIC | Age: 69
End: 2024-07-15
Payer: MEDICARE

## 2024-07-15 VITALS
DIASTOLIC BLOOD PRESSURE: 82 MMHG | HEIGHT: 62 IN | WEIGHT: 124.4 LBS | SYSTOLIC BLOOD PRESSURE: 116 MMHG | BODY MASS INDEX: 22.89 KG/M2

## 2024-07-15 DIAGNOSIS — N76.0 VULVOVAGINITIS: ICD-10-CM

## 2024-07-15 DIAGNOSIS — R39.89 SENSATION OF PRESSURE IN BLADDER AREA: ICD-10-CM

## 2024-07-15 DIAGNOSIS — N95.2 ATROPHIC VAGINITIS: Primary | ICD-10-CM

## 2024-07-15 PROCEDURE — 1123F ACP DISCUSS/DSCN MKR DOCD: CPT | Performed by: OBSTETRICS & GYNECOLOGY

## 2024-07-15 PROCEDURE — 99213 OFFICE O/P EST LOW 20 MIN: CPT | Performed by: OBSTETRICS & GYNECOLOGY

## 2024-07-15 PROCEDURE — 1159F MED LIST DOCD IN RCRD: CPT | Performed by: OBSTETRICS & GYNECOLOGY

## 2024-07-15 PROCEDURE — 1125F AMNT PAIN NOTED PAIN PRSNT: CPT | Performed by: OBSTETRICS & GYNECOLOGY

## 2024-07-15 PROCEDURE — 3008F BODY MASS INDEX DOCD: CPT | Performed by: OBSTETRICS & GYNECOLOGY

## 2024-07-15 PROCEDURE — 1160F RVW MEDS BY RX/DR IN RCRD: CPT | Performed by: OBSTETRICS & GYNECOLOGY

## 2024-07-15 ASSESSMENT — ENCOUNTER SYMPTOMS
OCCASIONAL FEELINGS OF UNSTEADINESS: 0
DEPRESSION: 0
LOSS OF SENSATION IN FEET: 0

## 2024-07-15 ASSESSMENT — LIFESTYLE VARIABLES
SKIP TO QUESTIONS 9-10: 0
HOW OFTEN DO YOU HAVE A DRINK CONTAINING ALCOHOL: MONTHLY OR LESS
HOW MANY STANDARD DRINKS CONTAINING ALCOHOL DO YOU HAVE ON A TYPICAL DAY: 1 OR 2
HOW OFTEN DO YOU HAVE SIX OR MORE DRINKS ON ONE OCCASION: LESS THAN MONTHLY
AUDIT-C TOTAL SCORE: 2

## 2024-07-15 ASSESSMENT — PAIN SCALES - GENERAL: PAINLEVEL: 1

## 2024-07-19 ASSESSMENT — ENCOUNTER SYMPTOMS
ABDOMINAL PAIN: 0
ABDOMINAL DISTENTION: 0

## 2024-07-19 NOTE — PROGRESS NOTES
Subjective   Patient ID: Diamond Diaz is a 68 y.o. female who presents for Follow-up (Pessary fitting).  67yo postmenopausal woman seen for follow-up evaluation after being diagnosed with some atrophic vaginitis in light of the very small cystocele.  Patient reports she is 70% improved of her symptoms and is overall feeling much better.  Patient does have follow-up with your urologist soon so is wanting to hold off on any pessary fittings until she has that appointment.        Review of Systems   Gastrointestinal:  Negative for abdominal distention and abdominal pain.   Genitourinary:  Positive for pelvic pain. Negative for vaginal bleeding, vaginal discharge and vaginal pain.       Objective   Physical Exam  Constitutional:       Appearance: Normal appearance.   HENT:      Head: Normocephalic and atraumatic.   Skin:     General: Skin is warm and dry.   Neurological:      General: No focal deficit present.      Mental Status: She is alert.   Psychiatric:         Attention and Perception: Attention and perception normal.         Mood and Affect: Mood and affect normal.         Speech: Speech normal.         Behavior: Behavior is cooperative.         Assessment/Plan   Problem List Items Addressed This Visit             ICD-10-CM    Sensation of pressure in bladder area R39.89     Other Visit Diagnoses         Codes    Atrophic vaginitis    -  Primary N95.2    Vulvovaginitis     N76.0        Patient given list of additional vaginal moisturizers for internal and external use.  Patient will follow-up with urology first and then will return for possible pessary fitting after evaluation should symptoms continue.  Patient will check in with this at that time.  Also discussed potential use of estrogen however patient is a daily smoker so would need to discuss further.         Nathanael Altamirano MD 07/19/24 3:09 PM

## 2024-07-24 ENCOUNTER — OFFICE VISIT (OUTPATIENT)
Dept: PAIN MEDICINE | Facility: CLINIC | Age: 69
End: 2024-07-24
Payer: MEDICARE

## 2024-07-24 VITALS
HEART RATE: 85 BPM | WEIGHT: 124 LBS | OXYGEN SATURATION: 97 % | SYSTOLIC BLOOD PRESSURE: 115 MMHG | HEIGHT: 62 IN | DIASTOLIC BLOOD PRESSURE: 69 MMHG | BODY MASS INDEX: 22.82 KG/M2

## 2024-07-24 DIAGNOSIS — G89.29 CHRONIC BILATERAL LOW BACK PAIN WITH SCIATICA, SCIATICA LATERALITY UNSPECIFIED: ICD-10-CM

## 2024-07-24 DIAGNOSIS — M54.16 LUMBAR RADICULOPATHY: Primary | ICD-10-CM

## 2024-07-24 DIAGNOSIS — M54.40 CHRONIC BILATERAL LOW BACK PAIN WITH SCIATICA, SCIATICA LATERALITY UNSPECIFIED: ICD-10-CM

## 2024-07-24 DIAGNOSIS — G89.4 CHRONIC PAIN SYNDROME: ICD-10-CM

## 2024-07-24 PROCEDURE — 99214 OFFICE O/P EST MOD 30 MIN: CPT | Performed by: NURSE PRACTITIONER

## 2024-07-24 PROCEDURE — 1123F ACP DISCUSS/DSCN MKR DOCD: CPT | Performed by: NURSE PRACTITIONER

## 2024-07-24 PROCEDURE — 3008F BODY MASS INDEX DOCD: CPT | Performed by: NURSE PRACTITIONER

## 2024-07-24 RX ORDER — TRAMADOL HYDROCHLORIDE 50 MG/1
50 TABLET ORAL EVERY 8 HOURS PRN
Qty: 90 TABLET | Refills: 2 | Status: SHIPPED | OUTPATIENT
Start: 2024-07-24 | End: 2024-08-23

## 2024-07-24 ASSESSMENT — PAIN DESCRIPTION - DESCRIPTORS: DESCRIPTORS: ACHING;SHARP;SHOOTING

## 2024-07-24 ASSESSMENT — PAIN - FUNCTIONAL ASSESSMENT: PAIN_FUNCTIONAL_ASSESSMENT: 0-10

## 2024-07-24 ASSESSMENT — PAIN SCALES - GENERAL
PAINLEVEL_OUTOF10: 5 - MODERATE PAIN
PAINLEVEL: 5

## 2024-07-24 NOTE — PROGRESS NOTES
MEDICATION NAME: Tramadol  STRENGTH: 50mg  LAST FILL DATE: 24  DATE LAST TAKEN: 24  QUANTITY FILLED: 90  QUANTITY REMAININ  COUNT COMPLETED BY: JOSTIN DUARTE MA and ARTURO TAFOYA      UDS LAST COMPLETED:   CONTROLLED SUBSTANCES AGREEMENT LAST SIGNED:   ORT LAST COMPLETED:  Modified [employer] disability form filled out annually.

## 2024-07-25 NOTE — ASSESSMENT & PLAN NOTE
Orders:    [place] pain management; Future    Epidural Steroid Injection; Future    traMADol (Ultram) 50 mg tablet; Take 1 tablet (50 mg) by mouth every 8 hours if needed for severe pain (7 - 10).

## 2024-07-25 NOTE — PROGRESS NOTES
Subjective   Patient ID: Diamond Diaz is a 68 y.o. female who presents for Follow-up (3 month).    HPI 68-year-old female with past medical history significant for osteoporosis, SLE, polyarthralgia, IBS, depression, chronic back pain, anxiety disorder, low back and left foot pain. She reports the pain started after her L5-S1 lumbar fusion surgery. She also underwent a laminectomy in 2012. She reports a fusion surgery and a revision in 2022. She reports that she has had multiple epidural steroid injections that were ineffective as well as other treatment modalities. Diamond presents for a Pain Management follow-up and Medication refill. She reports she has better been able to keep up with her ADLs as well as have some improved quality of life by using the Tramadol, however, she continues to have pain to the low back and tailbone as well as left foot.     Review of Systems Unless noted in the HPI all other systems have been reviewed and are negative for complaint.     Objective   Physical Exam  General- No acute distress, well appearing and well nourished.   Eyes Conjunctiva and lids: No erythema, swelling or discharge  Neck - Supple, no cervical lymphadenopathy.   Pulmonary - Respiratory effort: Normal respiration.   Cardiovascular - Normal rate and rhythm.  Examination of extremities for edema and/or varicosities: No peripheral edema  Abdomen: Soft, Non-tender, non-distended, no abdominal masses.   Musculoskeletal - Lumbar spine with reduced ROM. LLE with significantly decreased ROM.    Skin - Skin and subcutaneous tissue: Normal without rashes or lesions.  Neurologic -Severely antalgic gait; has to stop every 4-5 steps. SLR positive. Hypersensitivity to the LLE  Psychiatric - Orientation to person, place, and time: Normal. Mood and affect: Normal.  Assessment & Plan  Lumbar radiculopathy    Orders:    [place] pain management; Future    Epidural Steroid Injection; Future    traMADol (Ultram) 50 mg tablet; Take 1  tablet (50 mg) by mouth every 8 hours if needed for severe pain (7 - 10).    Chronic bilateral low back pain with sciatica, sciatica laterality unspecified         Chronic pain syndrome         TREATMENT PLAN:  I had a nice discussion with the patient today and our plan will be as follows:  Radiology: No new imaging to review at this time.   Physically: Encouraged patient to continue with increased physical activity as able.   Psychologically: No acute psychological needs at this time.    Medication: I will refill the patient's opioids today for [ 3 ] months. The patient continues to see benefit and improvement in their quality of life and ability to maintain ADLs. Patient educated about the risks of taking opioids and operating a motor vehicle. Patient reports no adverse side effects to current medication regimen.  Current regimen does allow patient to maintain ADLs.  Patient reports no new neurologic symptoms, new pain areas, or exacerbation in pain today.  Patient reports they are happy with current treatment care path. Patient has been educated on the risks, benefits, and alternatives of controlled substances as well as the proper way to store these medications. The patient and I discussed the nature of this medication and its side effects.  We discussed tolerance, physical dependence, psychological dependence, addiction and opioid-induced hyperalgesia.  We discussed the potential need to wean from this medication.  We discussed the availability of programs that can help with this process if necessary.  We discussed safety issues related to opioids including safe storage.  We discussed the fact that the patient should not drive an automobile or operate heavy machinery while taking this medication.  A prescription for naloxone was offered to the patient.  The patient will be re-evaluated for the need to continue opioid therapy in 60-90 days.  A PDMP report was reviewed today and was consistent with reported  prescribing.  Tox screen is up-to-date and consistent with prescribing history.  Duration: Chronic/ongoing.    Intervention: Based on patient's complaints and physical exam findings I believe she will benefit from a caudal epidural steroid injection to be performed under fluoroscopic guidance by Dr. Fulton.

## 2024-08-06 ENCOUNTER — APPOINTMENT (OUTPATIENT)
Dept: PRIMARY CARE | Facility: CLINIC | Age: 69
End: 2024-08-06
Payer: MEDICARE

## 2024-08-08 ENCOUNTER — TELEPHONE (OUTPATIENT)
Dept: PAIN MEDICINE | Facility: CLINIC | Age: 69
End: 2024-08-08
Payer: MEDICARE

## 2024-08-08 NOTE — TELEPHONE ENCOUNTER
----- Message from Christina DALEY sent at 7/29/2024  2:11 PM EDT -----  OK TO SCHEDULE AT [facility]

## 2024-08-16 ENCOUNTER — LAB (OUTPATIENT)
Dept: LAB | Facility: LAB | Age: 69
End: 2024-08-16
Payer: MEDICARE

## 2024-08-16 ENCOUNTER — PATIENT MESSAGE (OUTPATIENT)
Dept: PRIMARY CARE | Facility: CLINIC | Age: 69
End: 2024-08-16

## 2024-08-16 DIAGNOSIS — E03.9 ACQUIRED HYPOTHYROIDISM: ICD-10-CM

## 2024-08-16 LAB — TSH SERPL-ACNC: 1.22 MIU/L (ref 0.44–3.98)

## 2024-08-16 PROCEDURE — 36415 COLL VENOUS BLD VENIPUNCTURE: CPT

## 2024-08-16 PROCEDURE — 84443 ASSAY THYROID STIM HORMONE: CPT

## 2024-08-21 ENCOUNTER — APPOINTMENT (OUTPATIENT)
Dept: OBSTETRICS AND GYNECOLOGY | Facility: CLINIC | Age: 69
End: 2024-08-21
Payer: MEDICARE

## 2024-08-22 ENCOUNTER — APPOINTMENT (OUTPATIENT)
Dept: PRIMARY CARE | Facility: CLINIC | Age: 69
End: 2024-08-22
Payer: MEDICARE

## 2024-08-22 VITALS
DIASTOLIC BLOOD PRESSURE: 70 MMHG | HEIGHT: 62 IN | OXYGEN SATURATION: 97 % | SYSTOLIC BLOOD PRESSURE: 110 MMHG | BODY MASS INDEX: 23 KG/M2 | HEART RATE: 74 BPM | WEIGHT: 125 LBS

## 2024-08-22 DIAGNOSIS — E03.9 ACQUIRED HYPOTHYROIDISM: ICD-10-CM

## 2024-08-22 DIAGNOSIS — F41.9 ANXIETY DISORDER, UNSPECIFIED TYPE: Primary | ICD-10-CM

## 2024-08-22 DIAGNOSIS — M81.0 OSTEOPOROSIS, UNSPECIFIED OSTEOPOROSIS TYPE, UNSPECIFIED PATHOLOGICAL FRACTURE PRESENCE: ICD-10-CM

## 2024-08-22 PROCEDURE — 99213 OFFICE O/P EST LOW 20 MIN: CPT | Performed by: PHYSICIAN ASSISTANT

## 2024-08-22 PROCEDURE — 1126F AMNT PAIN NOTED NONE PRSNT: CPT | Performed by: PHYSICIAN ASSISTANT

## 2024-08-22 PROCEDURE — 1159F MED LIST DOCD IN RCRD: CPT | Performed by: PHYSICIAN ASSISTANT

## 2024-08-22 PROCEDURE — 1160F RVW MEDS BY RX/DR IN RCRD: CPT | Performed by: PHYSICIAN ASSISTANT

## 2024-08-22 PROCEDURE — 3008F BODY MASS INDEX DOCD: CPT | Performed by: PHYSICIAN ASSISTANT

## 2024-08-22 PROCEDURE — 1123F ACP DISCUSS/DSCN MKR DOCD: CPT | Performed by: PHYSICIAN ASSISTANT

## 2024-08-22 RX ORDER — LEVOTHYROXINE SODIUM 50 UG/1
50 TABLET ORAL DAILY
Qty: 90 TABLET | Refills: 1 | Status: SHIPPED | OUTPATIENT
Start: 2024-08-22 | End: 2025-02-18

## 2024-08-22 RX ORDER — CITALOPRAM 20 MG/1
20 TABLET, FILM COATED ORAL DAILY
Qty: 90 TABLET | Refills: 1 | Status: SHIPPED | OUTPATIENT
Start: 2024-08-22 | End: 2025-02-18

## 2024-08-22 ASSESSMENT — PAIN SCALES - GENERAL: PAINLEVEL: 0-NO PAIN

## 2024-08-22 ASSESSMENT — ENCOUNTER SYMPTOMS
DEPRESSED MOOD: 1
RESTLESSNESS: 1
NERVOUS/ANXIOUS: 1

## 2024-08-22 ASSESSMENT — COLUMBIA-SUICIDE SEVERITY RATING SCALE - C-SSRS: 1. IN THE PAST MONTH, HAVE YOU WISHED YOU WERE DEAD OR WISHED YOU COULD GO TO SLEEP AND NOT WAKE UP?: NO

## 2024-08-22 NOTE — PROGRESS NOTES
"Subjective   Patient ID: Diamond Diaz is a 68 y.o. female who presents for Anxiety (Patient states it depends on the day /She would like to be back on 20mg of celexa).    Anxiety  Presents for follow-up visit. Symptoms include depressed mood, nervous/anxious behavior and restlessness. Patient reports no excessive worry or palpitations. Symptoms occur most days. The severity of symptoms is moderate. The quality of sleep is fair.       Thyroid Problem  Presents for follow-up visit. Symptoms include anxiety and depressed mood. Patient reports no hoarse voice, palpitations, weight gain or weight loss.        Review of Systems   Constitutional:  Negative for weight gain and weight loss.   HENT:  Negative for hoarse voice.    Cardiovascular:  Negative for palpitations.   Psychiatric/Behavioral:  The patient is nervous/anxious.        Objective   /70   Pulse 74   Ht 1.575 m (5' 2\")   Wt 56.7 kg (125 lb)   SpO2 97%   BMI 22.86 kg/m²     Physical Exam  Constitutional:       Appearance: Normal appearance.   Eyes:      Extraocular Movements: Extraocular movements intact.      Pupils: Pupils are equal, round, and reactive to light.   Cardiovascular:      Rate and Rhythm: Normal rate and regular rhythm.      Pulses: Normal pulses.      Heart sounds: Normal heart sounds.   Pulmonary:      Effort: Pulmonary effort is normal.   Musculoskeletal:      Cervical back: Neck supple.      Right lower leg: No edema.      Left lower leg: No edema.   Neurological:      General: No focal deficit present.      Mental Status: She is alert. Mental status is at baseline.   Psychiatric:         Mood and Affect: Mood normal.         Thought Content: Thought content normal.         Judgment: Judgment normal.         Assessment/Plan   Diagnoses and all orders for this visit:  Anxiety disorder, unspecified type  -     citalopram (CeleXA) 20 mg tablet; Take 1 tablet (20 mg) by mouth once daily.  Acquired hypothyroidism  -     " levothyroxine (Synthroid, Levoxyl) 50 mcg tablet; Take 1 tablet (50 mcg) by mouth early in the morning.. Take on an empty stomach at the same time each day, either 30 to 60 minutes prior to breakfast  Osteoporosis, unspecified osteoporosis type, unspecified pathological fracture presence  Other orders  -     Follow Up In Primary Care - Established    Discussed options for her dEXA results also. She will think about Prolia injections.  Will increase Celexa and follow up in 2 months.

## 2024-08-26 ASSESSMENT — ENCOUNTER SYMPTOMS
HOARSE VOICE: 0
WEIGHT LOSS: 0
PALPITATIONS: 0
WEIGHT GAIN: 0

## 2024-09-10 ENCOUNTER — HOSPITAL ENCOUNTER (OUTPATIENT)
Dept: GASTROENTEROLOGY | Facility: HOSPITAL | Age: 69
Discharge: HOME | End: 2024-09-10
Payer: MEDICARE

## 2024-09-10 VITALS
OXYGEN SATURATION: 100 % | SYSTOLIC BLOOD PRESSURE: 102 MMHG | TEMPERATURE: 97.7 F | HEART RATE: 56 BPM | WEIGHT: 125 LBS | DIASTOLIC BLOOD PRESSURE: 65 MMHG | RESPIRATION RATE: 16 BRPM | HEIGHT: 62 IN | BODY MASS INDEX: 23 KG/M2

## 2024-09-10 DIAGNOSIS — M54.16 LUMBAR RADICULOPATHY: ICD-10-CM

## 2024-09-10 PROCEDURE — 2500000005 HC RX 250 GENERAL PHARMACY W/O HCPCS: Performed by: ANESTHESIOLOGY

## 2024-09-10 PROCEDURE — 2500000004 HC RX 250 GENERAL PHARMACY W/ HCPCS (ALT 636 FOR OP/ED): Performed by: ANESTHESIOLOGY

## 2024-09-10 PROCEDURE — 62323 NJX INTERLAMINAR LMBR/SAC: CPT | Performed by: ANESTHESIOLOGY

## 2024-09-10 PROCEDURE — 2550000001 HC RX 255 CONTRASTS: Performed by: ANESTHESIOLOGY

## 2024-09-10 RX ORDER — LIDOCAINE HYDROCHLORIDE 5 MG/ML
INJECTION, SOLUTION INFILTRATION; INTRAVENOUS AS NEEDED
Status: COMPLETED | OUTPATIENT
Start: 2024-09-10 | End: 2024-09-10

## 2024-09-10 RX ORDER — TRIAMCINOLONE ACETONIDE 40 MG/ML
INJECTION, SUSPENSION INTRA-ARTICULAR; INTRAMUSCULAR AS NEEDED
Status: COMPLETED | OUTPATIENT
Start: 2024-09-10 | End: 2024-09-10

## 2024-09-10 RX ORDER — SODIUM CHLORIDE 9 MG/ML
INJECTION, SOLUTION INTRAMUSCULAR; INTRAVENOUS; SUBCUTANEOUS AS NEEDED
Status: COMPLETED | OUTPATIENT
Start: 2024-09-10 | End: 2024-09-10

## 2024-09-10 ASSESSMENT — ENCOUNTER SYMPTOMS
LOSS OF SENSATION IN FEET: 1
DEPRESSION: 0
OCCASIONAL FEELINGS OF UNSTEADINESS: 0

## 2024-09-10 ASSESSMENT — PAIN DESCRIPTION - DESCRIPTORS: DESCRIPTORS: ACHING;NUMBNESS

## 2024-09-10 ASSESSMENT — PAIN - FUNCTIONAL ASSESSMENT
PAIN_FUNCTIONAL_ASSESSMENT: 0-10
PAIN_FUNCTIONAL_ASSESSMENT: 0-10

## 2024-09-10 ASSESSMENT — COLUMBIA-SUICIDE SEVERITY RATING SCALE - C-SSRS
6. HAVE YOU EVER DONE ANYTHING, STARTED TO DO ANYTHING, OR PREPARED TO DO ANYTHING TO END YOUR LIFE?: NO
2. HAVE YOU ACTUALLY HAD ANY THOUGHTS OF KILLING YOURSELF?: NO
1. IN THE PAST MONTH, HAVE YOU WISHED YOU WERE DEAD OR WISHED YOU COULD GO TO SLEEP AND NOT WAKE UP?: NO

## 2024-09-10 ASSESSMENT — PAIN SCALES - GENERAL
PAINLEVEL_OUTOF10: 0 - NO PAIN
PAINLEVEL_OUTOF10: 2

## 2024-09-10 NOTE — POST-PROCEDURE NOTE
Patient brought back from procedure room. Bandaids to back dry and intact. Patient denies any pain, denies any weakness or decrease in sensation. Discharge instructions explained to patient and copy provided. Patient denies any questions. Patient to follow up as scheduled. Patient discharged, ambulatory.

## 2024-09-10 NOTE — DISCHARGE INSTRUCTIONS
You underwent a procedure today    After most procedures, it is recommended that you relax and limit your activity for the remainder of the day unless you have been told otherwise by your pain physician.  You should not drive a car, operate machinery, or make important legal decisions unless otherwise directed by your pain physician.  You may resume your normal activity, including exercise, tomorrow.      Keep a written pain diary of how much pain relief you experienced following the procedure and the length of time of pain relief you experienced pain relief. Following diagnostic injections like medial branch nerve blocks, sacroiliac joint blocks, stellate ganglion injections and other blocks, it is very important you record the specific amount of pain relief you experienced immediately after the injectionand how long it lasted. Your doctor will ask you for this information at your follow up visit.     For all injections, please keep the injection site dry and inspect the site for a couple of days. You may remove the Band-Aid the day of the injection at any time.     Some discomfort, bruising or slight swelling may occur at the injection site. This is not abnormal if it occurs.  If needed you may:    -Take over the counter medication such as Tylenol or Motrin.   -Apply an ice pack for 30 minutes, 2 to 3 times a day for the first 24 hours.     You may shower today; no soaking baths, hot tubs, whirlpools or swimming pools for two days.      If you are given steroids in your injection, it may take 3-5 days for the steroid medication to take effect. You may notice a worsening of your symptoms for 1-2 days after the injection. This is not abnormal.  You may use acetaminophen, ibuprofen, or prescription medication that your doctor may have prescribed for you if you need to do so.     A few common side effects of steroids include facial flushing, sweating, restlessness, irritability,difficulty sleeping, increase in blood  sugar, and increased blood pressure. If you have diabetes, please monitor your blood sugar at least once a day for at least 5 days. If you have poorly controlled high blood pressure, monitoryour blood pressure for at least 2 days and contact your primary care physician if these numbers are unusually high for you.      If you take aspirin or non-steroidal anti-inflammatory drugs (examples are Motrin, Advil, ibuprofen, Naprosyn, Voltaren, Relafen, etc.) you may restart these this evening.    You do not need to discontinue non-aspirin-containing pain medications prior to an injection (examples: Celebrex, tramadol, hydrocodone and acetaminophen).      If you take a blood thinning medication (Coumadin, Lovenox, Fragmin,Ticlid, Plavix, Pradaxa, etc.), please discuss this with your primary care physician/cardiologist and your pain physician. These medications MUST be discontinued before you can have an injection safely, without the risk of uncontrolled bleeding. If these medications are not discontinued for an appropriate period of time, you will not be able to receivean injection.      If you are taking Coumadin, please have your INR checked the morning of your procedure and bringthe result to your appointment unless otherwise instructed. If your INR is over 1.2, your injection may need to be rescheduled to avoid uncontrolled bleeding from the needle placement.     Call [facility] at [phone] between 8am-4pm Monday - Friday if you are experiencing the following:    If you received an epidural or spinal injection:    -Headache that doesnot go away with medicine, is worse when sitting or standing up, and is greatly relieved upon lying down.   -Severe pain worse than or different than your baseline pain.   -Chills or fever (101º F or greater).   -Drainage or signs of infection at the injection site     Go directly to the Emergency Department if you are experiencing the following and received an  epidural or spinal injection:   -Abrupt weakness or progressive weakness in your legs that starts after you leave the clinic.   -Abrupt severe or worsening numbness in your legs.   -Inability to urinate after the injection or loss of bowel or bladder control without the urge to defecate or urinate.     If you have a clinical question that cannot wait until your next appointment, please call [phone] between 8am-4pm Monday - Friday or send a [employer] message. We do our best to return all non-emergency messages within 24 hours, Monday - Friday. A nurse or physician will return your message.      If you need to cancel an appointment, please call the scheduling staff at [phone] during normal business hours or leave a message at least 24 hours in advance.     If you are going to be sedated for your next procedure, you MUST have responsible adult who can legally drive accompany you home. You cannot eat or drink for eight hours prior to the planned procedure if you are going to receive sedation. You may take your non-blood thinning medications with a small sip of water.

## 2024-10-23 ENCOUNTER — OFFICE VISIT (OUTPATIENT)
Dept: PAIN MEDICINE | Facility: CLINIC | Age: 69
End: 2024-10-23
Payer: MEDICARE

## 2024-10-23 VITALS
HEART RATE: 80 BPM | SYSTOLIC BLOOD PRESSURE: 128 MMHG | OXYGEN SATURATION: 97 % | HEIGHT: 62 IN | WEIGHT: 125 LBS | BODY MASS INDEX: 23 KG/M2 | DIASTOLIC BLOOD PRESSURE: 83 MMHG

## 2024-10-23 DIAGNOSIS — M54.16 LUMBAR RADICULOPATHY: Primary | ICD-10-CM

## 2024-10-23 PROCEDURE — 99214 OFFICE O/P EST MOD 30 MIN: CPT | Performed by: NURSE PRACTITIONER

## 2024-10-23 PROCEDURE — 3008F BODY MASS INDEX DOCD: CPT | Performed by: NURSE PRACTITIONER

## 2024-10-23 PROCEDURE — 1123F ACP DISCUSS/DSCN MKR DOCD: CPT | Performed by: NURSE PRACTITIONER

## 2024-10-23 ASSESSMENT — PAIN - FUNCTIONAL ASSESSMENT: PAIN_FUNCTIONAL_ASSESSMENT: 0-10

## 2024-10-23 ASSESSMENT — PAIN SCALES - GENERAL
PAINLEVEL_OUTOF10: 5
PAINLEVEL_OUTOF10: 5 - MODERATE PAIN

## 2024-10-23 ASSESSMENT — PAIN DESCRIPTION - DESCRIPTORS: DESCRIPTORS: ACHING

## 2024-10-23 NOTE — PROGRESS NOTES
MEDICATION NAME: Tramadol  STRENGTH: 50 mg  LAST FILL DATE: 24  DATE LAST TAKEN: 10/24/24  QUANTITY FILLED: 90  QUANTITY REMAININ  COUNT COMPLETED BY: JOSTIN DUARTE MA and ARTURO TAFOYA    UDS LAST COMPLETED:   CONTROLLED SUBSTANCES AGREEMENT LAST SIGNED:   ORT LAST COMPLETED:  Modified Osw[employer] disability form filled out annually.

## 2024-10-24 RX ORDER — TRAMADOL HYDROCHLORIDE 50 MG/1
50 TABLET ORAL EVERY 8 HOURS PRN
Qty: 90 TABLET | Refills: 2 | Status: SHIPPED | OUTPATIENT
Start: 2024-10-24 | End: 2024-11-23

## 2024-10-25 ENCOUNTER — TELEPHONE (OUTPATIENT)
Dept: PAIN MEDICINE | Facility: CLINIC | Age: 69
End: 2024-10-25
Payer: MEDICARE

## 2024-10-25 DIAGNOSIS — M54.16 LUMBAR RADICULOPATHY: Primary | ICD-10-CM

## 2024-10-25 NOTE — PROGRESS NOTES
Subjective   Patient ID: Diamond Diaz is a 69 y.o. female who presents for Back Pain (Left foot).    HPI  68-year-old female with past medical history significant for osteoporosis, SLE, polyarthralgia, IBS, depression, chronic back pain, anxiety disorder, low back and left foot pain. She reports the pain started after her L5-S1 lumbar fusion surgery. She also underwent a laminectomy in 2012. She reports a fusion surgery and a revision in 2022. She reports that she has had multiple epidural steroid injections that were ineffective as well as other treatment modalities. Diamond presents for a Pain Management follow-up and Medication refill. She reports she has better been able to keep up with her ADLs as well as have some improved quality of life by using the Tramadol.     Of note, Diamond underwent a Caudal MARIIA with Dr. MIGUEL on 9/10/24. She reports at least 60-75% improvement in pain post-injection.     Review of Systems Unless noted in the HPI all other systems have been reviewed and are negative for complaint.     Objective   Physical Exam  General- No acute distress, well appearing and well nourished.   Eyes Conjunctiva and lids: No erythema, swelling or discharge  Neck - Supple, no cervical lymphadenopathy.   Pulmonary - Respiratory effort: Normal respiration.   Cardiovascular - Normal rate and rhythm.  Examination of extremities for edema and/or varicosities: No peripheral edema  Abdomen: Soft, Non-tender, non-distended, no abdominal masses.   Musculoskeletal - Lumbar spine with reduced ROM. LLE with significantly decreased ROM.    Skin - Skin and subcutaneous tissue: Normal without rashes or lesions.  Neurologic -Severely antalgic gait; has to stop every 4-5 steps. SLR positive. Hypersensitivity to the LLE  Psychiatric - Orientation to person, place, and time: Normal. Mood and affect: Normal.    Assessment/Plan   Assessment & Plan  Lumbar radiculopathy    Orders:    traMADol (Ultram) 50 mg tablet; Take 1  tablet (50 mg) by mouth every 8 hours if needed for severe pain (7 - 10).        TREATMENT PLAN:  I had a nice discussion with the patient today and our plan will be as follows:  Radiology: No new imaging to review at this time.   Physically: Encouraged patient to continue with increased physical activity as able.   Psychologically: No acute psychological needs at this time.    Medication: I will refill the patient's opioids today for [ 3 ] months. The patient continues to see benefit and improvement in their quality of life and ability to maintain ADLs. Patient educated about the risks of taking opioids and operating a motor vehicle. Patient reports no adverse side effects to current medication regimen.  Current regimen does allow patient to maintain ADLs.  Patient reports no new neurologic symptoms, new pain areas, or exacerbation in pain today.  Patient reports they are happy with current treatment care path. Patient has been educated on the risks, benefits, and alternatives of controlled substances as well as the proper way to store these medications. The patient and I discussed the nature of this medication and its side effects.  We discussed tolerance, physical dependence, psychological dependence, addiction and opioid-induced hyperalgesia.  We discussed the potential need to wean from this medication.  We discussed the availability of programs that can help with this process if necessary.  We discussed safety issues related to opioids including safe storage.  We discussed the fact that the patient should not drive an automobile or operate heavy machinery while taking this medication.  A prescription for naloxone was offered to the patient.  The patient will be re-evaluated for the need to continue opioid therapy in 60-90 days.  A PDMP report was reviewed today and was consistent with reported prescribing.  Tox screen is up-to-date and consistent with prescribing history. Tox screen will be due next visit.    Duration: Chronic/ongoing.    Intervention: Diamond is s/p Caudal MARIIA with Dr. MIGUEL on 9/10/24 with a reported 60-75% improvement in pain. She is pleased with this outcome and will follow-up with us on an as-needed basis.

## 2024-10-25 NOTE — TELEPHONE ENCOUNTER
Pt calls stating she saw you on 10/23/2024 and received tramadol but the Lidocaine patch was not sent in to pharmacy. She is requesting this.

## 2024-10-25 NOTE — ASSESSMENT & PLAN NOTE
Orders:    traMADol (Ultram) 50 mg tablet; Take 1 tablet (50 mg) by mouth every 8 hours if needed for severe pain (7 - 10).

## 2024-10-27 RX ORDER — LIDOCAINE 50 MG/G
1 PATCH TOPICAL DAILY
Qty: 30 PATCH | Refills: 2 | Status: SHIPPED | OUTPATIENT
Start: 2024-10-27

## 2025-01-15 ENCOUNTER — APPOINTMENT (OUTPATIENT)
Dept: PAIN MEDICINE | Facility: CLINIC | Age: 70
End: 2025-01-15
Payer: MEDICARE

## 2025-01-22 ENCOUNTER — OFFICE VISIT (OUTPATIENT)
Dept: PAIN MEDICINE | Facility: CLINIC | Age: 70
End: 2025-01-22
Payer: MEDICARE

## 2025-01-22 VITALS
SYSTOLIC BLOOD PRESSURE: 94 MMHG | OXYGEN SATURATION: 99 % | RESPIRATION RATE: 22 BRPM | WEIGHT: 125 LBS | BODY MASS INDEX: 23 KG/M2 | HEART RATE: 66 BPM | HEIGHT: 62 IN | DIASTOLIC BLOOD PRESSURE: 70 MMHG

## 2025-01-22 DIAGNOSIS — Z79.891 ENCOUNTER FOR LONG-TERM OPIATE ANALGESIC USE: ICD-10-CM

## 2025-01-22 DIAGNOSIS — M54.16 LUMBAR RADICULOPATHY: Primary | ICD-10-CM

## 2025-01-22 PROCEDURE — G2211 COMPLEX E/M VISIT ADD ON: HCPCS | Performed by: ANESTHESIOLOGY

## 2025-01-22 PROCEDURE — 1159F MED LIST DOCD IN RCRD: CPT | Performed by: ANESTHESIOLOGY

## 2025-01-22 PROCEDURE — 3008F BODY MASS INDEX DOCD: CPT | Performed by: ANESTHESIOLOGY

## 2025-01-22 PROCEDURE — 99406 BEHAV CHNG SMOKING 3-10 MIN: CPT | Performed by: ANESTHESIOLOGY

## 2025-01-22 PROCEDURE — 99214 OFFICE O/P EST MOD 30 MIN: CPT | Performed by: ANESTHESIOLOGY

## 2025-01-22 PROCEDURE — 80323 ALKALOIDS NOS: CPT | Performed by: ANESTHESIOLOGY

## 2025-01-22 PROCEDURE — 1123F ACP DISCUSS/DSCN MKR DOCD: CPT | Performed by: ANESTHESIOLOGY

## 2025-01-22 RX ORDER — TRAMADOL HYDROCHLORIDE 50 MG/1
50 TABLET ORAL EVERY 8 HOURS PRN
Qty: 90 TABLET | Refills: 2 | Status: SHIPPED | OUTPATIENT
Start: 2025-01-22 | End: 2025-04-22

## 2025-01-22 ASSESSMENT — PAIN - FUNCTIONAL ASSESSMENT: PAIN_FUNCTIONAL_ASSESSMENT: 0-10

## 2025-01-22 ASSESSMENT — PAIN SCALES - GENERAL
PAINLEVEL_OUTOF10: 5
PAINLEVEL_OUTOF10: 5 - MODERATE PAIN

## 2025-01-22 ASSESSMENT — ENCOUNTER SYMPTOMS
BACK PAIN: 1
ACTIVITY CHANGE: 1

## 2025-01-22 ASSESSMENT — PAIN DESCRIPTION - DESCRIPTORS: DESCRIPTORS: ACHING;DULL

## 2025-01-22 NOTE — PROGRESS NOTES
MEDICATION NAME: tramadol  STRENGTH: 50 mg  LAST FILL DATE: 2024  DATE LAST TAKEN: today  QUANTITY FILLED: 90  QUANTITY REMAININ  COUNT COMPLETED BY: MARIAMA DAMON LPN and SANDY CRISTINA RN      UDS LAST COMPLETED: 2024.

## 2025-01-22 NOTE — PROGRESS NOTES
The patient is a 69-year-old female with low back and left leg pain.  The pain is constant but waxes and wanes.  The patient gets some relief with rest.  She gets some relief with use of tramadol.  She also benefits from topical diclofenac and lidocaine patches.  She would like to avoid interventions at this time.    Review of Systems   Constitutional:  Positive for activity change.   Musculoskeletal:  Positive for back pain and gait problem.   All other systems reviewed and are negative.    GENERAL: alert and appropriate, in no distress, well-hydrated, well-nourished and happy, smiling, interactive  SKIN: no rash noted  RESPIRATORY: breathing non-labored and no grunting/flaring/retractions  CHEST: equal chest rise with normal respiratory effort  ABDOMEN: soft and non-tender  BACK: back normal in appearance, spine with reduced ROM  EXTREMITIES: strength intact  NEUROLOGIC: gait antalgic, SLR negative, sensation grossly intact.    Assessment and Plan    -Chronicity--chronic    -Diagnostics--no new imaging    -Pharmacologic--refill tramodol.  [place] [facility] [place] were reviewed and were appropriate.  A saliva sample was collected for toxicology screening.      -Psychologic--no need for psychologic intervention from my standpoint.  There are no mental health issues of which I am aware that are contributing to the patient's pain.  There are no substance abuse or alcohol abuse issues of which I am aware that are contributing to the patient's pain.    -Physical--we discussed the importance of physical therapy and exercise.  We discussed avoidance and modification techniques.    -Intervention--no intervention planned    I spent time educating the patient on the condition including the treatment and the prognosis.  I invited the patient to call at anytime with any questions.      The patient smokes cigarettes. I encouraged and counseled on smoking cessation as this may increase systemic  inflammatory factors which may contribute to patient's chronic pain in addition to smoking as generalized health detriments.

## 2025-01-30 LAB — SCAN RESULT: NORMAL

## 2025-02-24 ENCOUNTER — TELEPHONE (OUTPATIENT)
Dept: PRIMARY CARE | Facility: CLINIC | Age: 70
End: 2025-02-24
Payer: MEDICARE

## 2025-02-24 DIAGNOSIS — E03.9 ACQUIRED HYPOTHYROIDISM: ICD-10-CM

## 2025-02-24 RX ORDER — LEVOTHYROXINE SODIUM 50 UG/1
50 TABLET ORAL DAILY
Qty: 90 TABLET | Refills: 1 | Status: SHIPPED | OUTPATIENT
Start: 2025-02-24 | End: 2025-08-23

## 2025-02-24 NOTE — TELEPHONE ENCOUNTER
Rx Refill Request Telephone Encounter    Name:  Diamond Diaz  :  [id number]  Medication Name:  Levothyroxine, has 2 pills left            Specific Pharmacy location:  [facility]  Date of last appointment:    Date of next appointment:    Best number to reach patient:

## 2025-04-16 ENCOUNTER — APPOINTMENT (OUTPATIENT)
Dept: PAIN MEDICINE | Facility: CLINIC | Age: 70
End: 2025-04-16
Payer: MEDICARE

## 2025-04-30 ENCOUNTER — OFFICE VISIT (OUTPATIENT)
Dept: PAIN MEDICINE | Facility: CLINIC | Age: 70
End: 2025-04-30
Payer: MEDICARE

## 2025-04-30 VITALS
HEART RATE: 84 BPM | DIASTOLIC BLOOD PRESSURE: 84 MMHG | SYSTOLIC BLOOD PRESSURE: 124 MMHG | OXYGEN SATURATION: 95 % | BODY MASS INDEX: 23 KG/M2 | HEIGHT: 62 IN | WEIGHT: 125 LBS

## 2025-04-30 DIAGNOSIS — M54.16 LUMBAR RADICULOPATHY: ICD-10-CM

## 2025-04-30 PROCEDURE — 1125F AMNT PAIN NOTED PAIN PRSNT: CPT | Performed by: PHYSICIAN ASSISTANT

## 2025-04-30 PROCEDURE — 99214 OFFICE O/P EST MOD 30 MIN: CPT | Performed by: PHYSICIAN ASSISTANT

## 2025-04-30 PROCEDURE — 3008F BODY MASS INDEX DOCD: CPT | Performed by: PHYSICIAN ASSISTANT

## 2025-04-30 PROCEDURE — 1159F MED LIST DOCD IN RCRD: CPT | Performed by: PHYSICIAN ASSISTANT

## 2025-04-30 PROCEDURE — G2211 COMPLEX E/M VISIT ADD ON: HCPCS | Performed by: PHYSICIAN ASSISTANT

## 2025-04-30 RX ORDER — TRAMADOL HYDROCHLORIDE 50 MG/1
50 TABLET ORAL EVERY 8 HOURS PRN
Qty: 90 TABLET | Refills: 2 | Status: SHIPPED | OUTPATIENT
Start: 2025-05-07 | End: 2025-04-30 | Stop reason: WASHOUT

## 2025-04-30 RX ORDER — TRAMADOL HYDROCHLORIDE 50 MG/1
50 TABLET ORAL EVERY 8 HOURS PRN
Qty: 90 TABLET | Refills: 2 | Status: SHIPPED | OUTPATIENT
Start: 2025-05-07 | End: 2025-08-05

## 2025-04-30 RX ORDER — TRAMADOL HYDROCHLORIDE 50 MG/1
50 TABLET ORAL EVERY 8 HOURS PRN
Qty: 90 TABLET | Refills: 2 | Status: SHIPPED | OUTPATIENT
Start: 2025-04-30 | End: 2025-04-30 | Stop reason: SDUPTHER

## 2025-04-30 RX ORDER — TRAMADOL HYDROCHLORIDE 50 MG/1
50 TABLET ORAL EVERY 8 HOURS PRN
COMMUNITY

## 2025-04-30 ASSESSMENT — PAIN DESCRIPTION - DESCRIPTORS: DESCRIPTORS: ACHING

## 2025-04-30 ASSESSMENT — PAIN SCALES - GENERAL
PAINLEVEL_OUTOF10: 6
PAINLEVEL_OUTOF10: 6

## 2025-04-30 ASSESSMENT — PAIN - FUNCTIONAL ASSESSMENT: PAIN_FUNCTIONAL_ASSESSMENT: 0-10

## 2025-04-30 NOTE — PROGRESS NOTES
"Chronic Pain Clinic Follow-Up Evaluation    Date: April 30, 2025 - 9:24 AM    Chief Complaint: No chief complaint on file.      SUBJECTIVE:    Diamond Diaz is a 69 y.o. female who presents to [facility] for a follow up appointment for ***.    Patient is an established patient of Dr Fulton     Patient has hx of:     PMH also significant for:      The plan from the last visit on *** was:    Since the last visit, ***     Tramadol     Pain with sitting, activity,walking   Only no pain with lying down,sleep     Nerve damage left foot     Low back > foot pain as the day progresses   6/10 by the end of the day     Low back surgery   Had injections in the back     MRI 11/2021     Had EMG     Had discussed SCS with Dr Alvarado in the past     Tramadol takes the edge   Lidocaien and voltaren gel,tylenol and ibuprofen   Has macular degeneration     Duration of pain:      The pain is located      Pain Description      Aggravating Factors:      Alleviating Factors:       Previous pain treatments:     Physical Therapy:    Current Outpatient Medications:  Current Medications[1]    Current Anticoagulant Therapy: {NEGATIVE/POSITIVE:87276}    OARRS: {OARRS:32244}    Pain medications reviewed:  {YES/NO:63}    Past Medical History:  Medical History[2]    Past Surgical History:  Surgical History[3]    Family History:  Family History[4]    Social History:  Social History     Substance and Sexual Activity   Alcohol Use Yes    Comment: Social beer drinker \"when healthy\".     Tobacco Use History[5]  Social History     Substance and Sexual Activity   Drug Use Never         Review of Systems:  GENERAL: {ROS-GENERAL:23::\"SEE HPI\",\"No weight loss, malaise or fevers.\"}  HEENT: {ROS -  HEENT:30::\"Negative for frequent or significant headaches\",\"No changes in hearing or vision, no nose bleeds or other nasal problems\"}  NECK: {ROS -NECK:34::\"Negative for lumps, goiter, pain and significant neck swelling\"}  RESPIRATORY: {ROS " "- RESPIRATORY:37::\"Negative for cough, wheezing or shortness of breath.\"}  CARDIOVASCULAR: {ROS-CARDIAC:53::\"Negative for chest pain, leg swelling or palpitations.\"}  GASTROINTESTINAL: {ROS- GI:56::\"Negative for abdominal discomfort, blood in stools or black stools or change in bowel habits\"}  GENITOURINARY: {ROS- :58::\"No history of dysuria, frequency or incontinence\"}  GYN: {ROS- GYN:71::\"Negative for abnormal vaginal bleeding, abnormal vaginal discharge\"}  MUSCULOSKELETAL: {MUSCULOSKELETAL INTMED ROS:232::\"Negative for joint pain or swelling, back pain or muscle pain.\"}  NEUROLOGIC: {NEUROLOGIC INT MED ROS:235::\"Negative for focal numbness or weakness, headaches and dizziness or syncope.\"}  SKIN: {SKIN INT MED ROS:238::\"Negative for lesions, rash, and itching.\"}  PSYCHIATRIC: {PSYCHIATRIC INT MED ROS:241::\"Negative for sleep disturbance, mood disorder and recent psychosocial stressors.\"}  HEMATOLOGIC/LYMPHATIC/IMMUNOLOGIC: {HEMATOLOGY/LYMPHATIC/IMMUNOLOGY INT MED ROS:244::\"Negative for prolonged bleeding, bruising easily or swollen nodes.\"}  ENDOCRINE: {ENDOCRINE INT MED ROS:247::\"Negative for cold or heat intolerance, polyuria, polydipsia and goiter.\"}  The remainder of the ROS was negative.    Physical Examination:  There were no vitals filed for this visit.  General:in no acute distress  Skin: skin color, texture, turgor normal, no rashes or lesions  HEENT: normocephalic, atraumatic, sclera non-icteric   Resp: unlabored on room air   Musculoskeletal:  Neck: {[facility] NECK:24340::\"Supple; good ROM.\"}  Back: {[facility] BACK:70510::\"No pain on palpation of the lumbar spine. Full ROM without reproducible pain.  Straight leg raising test negative bilaterally.  \"}  Extremities: {EXTREMITY EXAM:3013::\"Extremities normal. No deformities, edema, or skin discoloration\"}  Neurological:  Mental Status: alert and oriented  Gait: {[facility] FAY GAIT:78420}    New Imaging and Diagnostic Studies:  ***    ASSESSMENT:    Diamond Diaz " is a 69 y.o. female with ***. Since last visit, the pain has ***.    {Assess/Plan SmartLinks (Optional):13839}    PLAN:    Diagnostics:      Physical Therapy and Rehabilitation:     Psychologically:    Medication: Continue current medication regimen. See counseling note below. Prescription for *** sent to the pharmacy   Saliva tox last collected - 1/2025  Opioid agreement signed today   Pill count     Duration:     Intervention: None needed at this time.     7.   Follow up:    The patient continues to see benefit and improvement in their quality of life and ability to maintain ADLs.  Patient educated about the risks of taking opioids and operating a motor vehicle.  Patient reports no adverse side effects to current medication regimen.  Current regimen does allow patient to maintain ADLs.      Patient has been educated on the risks, benefits, and alternatives of controlled substances as well as the proper way to store these medications.  The patient and I discussed the nature of this medication and its side effects.  We discussed tolerance, physical dependence, psychological dependence, addiction and opioid-induced hyperalgesia.   We discussed the fact that the patient should not drive an automobile or operate heavy machinery while taking this medication.     The above plan and management options were discussed at length with patient. Patient is in agreement with the above and verbalized understanding. It will be communicated with the referring physician via electronic record, fax, or mail.    Hafsa Copeland PA-C  April 30, 2025         [1]   Current Outpatient Medications   Medication Sig Dispense Refill    levothyroxine (Synthroid, Levoxyl) 50 mcg tablet Take 1 tablet (50 mcg) by mouth early in the morning.. Take on an empty stomach at the same time each day, either 30 to 60 minutes prior to breakfast 90 tablet 1    lidocaine (Lidoderm) 5 % patch Place 1 patch over 12 hours on the skin once daily. Remove &  discard patch within 12 hours or as directed by MD. 30 patch 2    nystatin (Mycostatin) ointment Apply topically 2 times a day. 30 g 0     No current facility-administered medications for this visit.   [2]   Past Medical History:  Diagnosis Date    Acute non-recurrent maxillary sinusitis 01/04/2024    Chest wall pain 01/04/2024    Cigarette nicotine dependence with nicotine-induced disorder 01/04/2024    Fatigue 02/09/2022    Left foot pain 01/04/2024    Numbness of foot 01/17/2023    Trigger finger of right hand 01/04/2024    Trigger finger, unspecified finger     Trigger finger of right hand   [3]   Past Surgical History:  Procedure Laterality Date    APPENDECTOMY      BI US GUIDED BREAST LOCALIZATION AND BIOPSY LEFT Left 09/23/2014    BI US GUIDED BREAST LOCALIZATION AND BIOPSY LEFT [facility]    CHOLECYSTECTOMY      HYSTERECTOMY     [4]   Family History  Problem Relation Name Age of Onset    Stroke Mother      Diabetes Father      Heart attack Father     [5]   Social History  Tobacco Use   Smoking Status Every Day    Current packs/day: 0.50    Types: Cigarettes   Smokeless Tobacco Never

## 2025-04-30 NOTE — PROGRESS NOTES
MEDICATION NAME: Tramadol  STRENGTH: 50 mg  LAST FILL DATE: 3/25/25  DATE LAST TAKEN: 25  QUANTITY FILLED: 90  QUANTITY REMAININ  COUNT COMPLETED BY: JOSTIN DUARTE MA and MANGO STEELE LPN      UDS LAST COMPLETED:   CONTROLLED SUBSTANCES AGREEMENT LAST SIGNED:   ORT LAST COMPLETED:  Modified [employer] disability form filled out annually.

## 2025-05-10 ASSESSMENT — ENCOUNTER SYMPTOMS
EYES NEGATIVE: 1
GASTROINTESTINAL NEGATIVE: 1
CARDIOVASCULAR NEGATIVE: 1
ALLERGIC/IMMUNOLOGIC NEGATIVE: 1
PSYCHIATRIC NEGATIVE: 1
CONSTITUTIONAL NEGATIVE: 1
ENDOCRINE NEGATIVE: 1
NEUROLOGICAL NEGATIVE: 1
RESPIRATORY NEGATIVE: 1
BACK PAIN: 1
HEMATOLOGIC/LYMPHATIC NEGATIVE: 1

## 2025-05-29 DIAGNOSIS — Z87.891 PERSONAL HISTORY OF NICOTINE DEPENDENCE: ICD-10-CM

## 2025-05-29 DIAGNOSIS — Z72.0 TOBACCO ABUSE: Primary | ICD-10-CM

## 2025-05-30 ENCOUNTER — TELEPHONE (OUTPATIENT)
Dept: RADIOLOGY | Facility: HOSPITAL | Age: 70
End: 2025-05-30
Payer: MEDICARE

## 2025-05-30 NOTE — TELEPHONE ENCOUNTER
Call to Diamond to assist with scheduling CT lung screening imaging. No answer. Unable to leave a voice message; mailbox not setup. Stance message sent.

## 2025-06-20 ENCOUNTER — TELEPHONE (OUTPATIENT)
Dept: RADIOLOGY | Facility: HOSPITAL | Age: 70
End: 2025-06-20
Payer: MEDICARE

## 2025-06-20 NOTE — TELEPHONE ENCOUNTER
Outreach call to assist in scheduling annual lung cancer screening exam -  not set up unable to leave message.

## 2025-07-30 ENCOUNTER — OFFICE VISIT (OUTPATIENT)
Dept: PAIN MEDICINE | Facility: CLINIC | Age: 70
End: 2025-07-30
Payer: MEDICARE

## 2025-07-30 VITALS
SYSTOLIC BLOOD PRESSURE: 103 MMHG | HEIGHT: 62 IN | WEIGHT: 125 LBS | BODY MASS INDEX: 23 KG/M2 | OXYGEN SATURATION: 98 % | DIASTOLIC BLOOD PRESSURE: 71 MMHG | HEART RATE: 71 BPM

## 2025-07-30 DIAGNOSIS — Z51.81 ENCOUNTER FOR MEDICATION MONITORING: ICD-10-CM

## 2025-07-30 DIAGNOSIS — M25.512 ACUTE PAIN OF LEFT SHOULDER: ICD-10-CM

## 2025-07-30 DIAGNOSIS — M54.16 LUMBAR RADICULOPATHY: ICD-10-CM

## 2025-07-30 DIAGNOSIS — G89.4 CHRONIC PAIN SYNDROME: ICD-10-CM

## 2025-07-30 PROCEDURE — 99214 OFFICE O/P EST MOD 30 MIN: CPT | Performed by: PHYSICIAN ASSISTANT

## 2025-07-30 PROCEDURE — G2211 COMPLEX E/M VISIT ADD ON: HCPCS | Performed by: PHYSICIAN ASSISTANT

## 2025-07-30 PROCEDURE — 1159F MED LIST DOCD IN RCRD: CPT | Performed by: PHYSICIAN ASSISTANT

## 2025-07-30 PROCEDURE — 3008F BODY MASS INDEX DOCD: CPT | Performed by: PHYSICIAN ASSISTANT

## 2025-07-30 RX ORDER — TRAMADOL HYDROCHLORIDE 50 MG/1
50 TABLET, FILM COATED ORAL EVERY 6 HOURS PRN
Qty: 120 TABLET | Refills: 2 | Status: SHIPPED | OUTPATIENT
Start: 2025-08-04 | End: 2025-11-02

## 2025-07-30 ASSESSMENT — PATIENT HEALTH QUESTIONNAIRE - PHQ9
1. LITTLE INTEREST OR PLEASURE IN DOING THINGS: NOT AT ALL
2. FEELING DOWN, DEPRESSED OR HOPELESS: NOT AT ALL
SUM OF ALL RESPONSES TO PHQ9 QUESTIONS 1 AND 2: 0

## 2025-07-30 ASSESSMENT — PAIN DESCRIPTION - DESCRIPTORS: DESCRIPTORS: ACHING

## 2025-07-30 ASSESSMENT — PAIN SCALES - GENERAL
PAINLEVEL_OUTOF10: 5 - MODERATE PAIN
PAINLEVEL_OUTOF10: 5

## 2025-07-30 ASSESSMENT — PAIN - FUNCTIONAL ASSESSMENT: PAIN_FUNCTIONAL_ASSESSMENT: 0-10

## 2025-07-30 NOTE — PROGRESS NOTES
"Chronic Pain Clinic Follow-Up Evaluation    Date: July 30, 2025 - 2:57 PM    Chief Complaint:   Chief Complaint   Patient presents with    Med Refill       SUBJECTIVE:    Diamond Diaz is a 69 y.o. female who presents to [facility] for a follow up appointment for ***.    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     Tobacco Use History[5]  Social History     Substance and Sexual Activity   Drug Use Never         Review of Systems     Physical Examination:  Vitals:    07/30/25 1440   BP: 103/71   Pulse: 71   SpO2: 98%     General:in no acute distress  Skin: skin color, texture, turgor normal, no rashes or lesions  HEENT: normocephalic, atraumatic, sclera non-icteric   Resp: unlabored on room air   Musculoskeletal:  Neck: {[facility] NECK:95531::\"Supple; good ROM.\"}  Back: {[facility] BACK:23017::\"No pain on palpation of the lumbar spine. Full ROM without reproducible pain.  Straight leg raising test negative bilaterally.  \"}  Extremities: {EXTREMITY " "EXAM:3013::\"Extremities normal. No deformities, edema, or skin discoloration\"}  Neurological:  Mental Status: alert and oriented  Gait: {[facility] GAIT:45746}    New Imaging and Diagnostic Studies:  ***    ASSESSMENT:  Diamond Diaz is a 69 y.o. female with low back and leg pain secondary to lumbar radiculopathy . Since last visit, the pain has been stable overall. She takes tramadol + OTC products for pain control. She denies side effects with current regimen which allow her to maintain her function and ADL's.       {Assess/Plan SmartLinks (Optional):36590}    PLAN:    Diagnostics: Not indicated at this time ***     Physical Therapy and Rehabilitation: Continue HEP ***     Psychologically: No need for psychologic intervention from my standpoint. There are no mental health issues of which I am aware that are contributing to the patient's pain. There are no substance abuse or alcohol abuse issues of which I am aware that are contributing to the patient's pain. ***    Medication: Continue current medication regimen. See counseling note below. Prescription for *** sent to the pharmacy   Urine tox last collected - today (7/2025)  Opioid agreement signed - 4/2025  Pill count     Duration: years, chronic and ongoing ***    Intervention: None needed at this time. ***    7.   Follow up: ***    The patient continues to see benefit and improvement in their quality of life and ability to maintain ADLs.  Patient educated about the risks of taking opioids and operating a motor vehicle.  Patient reports no adverse side effects to current medication regimen.  Current regimen does allow patient to maintain ADLs.        Patient has been educated on the risks, benefits, and alternatives of controlled substances as well as the proper way to store these medications.  The patient and I discussed the nature of this medication and its side effects.  We discussed tolerance, physical dependence, psychological dependence, addiction and " opioid-induced hyperalgesia.   We discussed the fact that the patient should not drive an automobile or operate heavy machinery while taking this medication.     The above plan and management options were discussed at length with patient. Patient is in agreement with the above and verbalized understanding. It will be communicated with the referring physician via electronic record, fax, or mail.    Hafsa Copeland PA-C  July 30, 2025         [1]   Current Outpatient Medications   Medication Sig Dispense Refill    levothyroxine (Synthroid, Levoxyl) 50 mcg tablet Take 1 tablet (50 mcg) by mouth early in the morning.. Take on an empty stomach at the same time each day, either 30 to 60 minutes prior to breakfast 90 tablet 1    lidocaine (Lidoderm) 5 % patch Place 1 patch over 12 hours on the skin once daily. Remove & discard patch within 12 hours or as directed by MD. 30 patch 2    traMADol (Ultram) 50 mg tablet Take 1 tablet (50 mg) by mouth every 8 hours if needed for severe pain (7 - 10).      traMADol (Ultram) 50 mg tablet Take 1 tablet (50 mg) by mouth every 8 hours if needed for severe pain (7 - 10). Do not fill before May 7, 2025. 90 tablet 2     No current facility-administered medications for this visit.   [2]   Past Medical History:  Diagnosis Date    Acute non-recurrent maxillary sinusitis 01/04/2024    Chest wall pain 01/04/2024    Cigarette nicotine dependence with nicotine-induced disorder 01/04/2024    Fatigue 02/09/2022    Left foot pain 01/04/2024    Numbness of foot 01/17/2023    Trigger finger of right hand 01/04/2024    Trigger finger, unspecified finger     Trigger finger of right hand   [3]   Past Surgical History:  Procedure Laterality Date    APPENDECTOMY      BI US GUIDED BREAST LOCALIZATION AND BIOPSY LEFT Left 09/23/2014    BI US GUIDED BREAST LOCALIZATION AND BIOPSY LEFT [facility]    CHOLECYSTECTOMY      HYSTERECTOMY     [4]   Family History  Problem Relation Name Age of Onset     Stroke Mother      Diabetes Father      Heart attack Father     [5]   Social History  Tobacco Use   Smoking Status Every Day    Current packs/day: 0.50    Average packs/day: 0.5 packs/day for 51.6 years (25.8 ttl pk-yrs)    Types: Cigarettes    Start date: 1974   Smokeless Tobacco Never

## 2025-07-30 NOTE — PROGRESS NOTES
MEDICATION NAME: Tramadol  STRENGTH: 501 mg  LAST FILL DATE: 25  DATE LAST TAKEN: 25  QUANTITY FILLED: 90  QUANTITY REMAININ  COUNT COMPLETED BY: JOSTIN DUARTE MA and JOSTIN MURDOCK MA      UDS COMPLETED  CONTROLLED SUBSTANCES AGREEMENT SIGNED  ORT COMPLETED  Modified [employer] disability form filled out annually.

## 2025-08-01 LAB
1OH-MIDAZOLAM UR-MCNC: NEGATIVE NG/ML
7AMINOCLONAZEPAM UR-MCNC: NEGATIVE NG/ML
A-OH ALPRAZ UR-MCNC: NEGATIVE NG/ML
A-OH-TRIAZOLAM UR-MCNC: NEGATIVE NG/ML
AMPHETAMINES UR QL: NEGATIVE NG/ML
BARBITURATES UR QL: NEGATIVE NG/ML
BZE UR QL: NEGATIVE NG/ML
CODEINE UR-MCNC: NEGATIVE NG/ML
CREAT UR-MCNC: 135 MG/DL
DRUG SCREEN COMMENT UR-IMP: ABNORMAL
EDDP UR-MCNC: NEGATIVE NG/ML
FENTANYL UR-MCNC: ABNORMAL NG/ML
HYDROCODONE UR-MCNC: NEGATIVE NG/ML
HYDROMORPHONE UR-MCNC: NEGATIVE NG/ML
LORAZEPAM UR-MCNC: NEGATIVE NG/ML
METHADONE UR-MCNC: NEGATIVE NG/ML
MORPHINE UR-MCNC: NEGATIVE NG/ML
NORDIAZEPAM UR-MCNC: NEGATIVE NG/ML
NORFENTANYL UR-MCNC: ABNORMAL NG/ML
NORHYDROCODONE UR CFM-MCNC: NEGATIVE NG/ML
NOROXYCODONE UR CFM-MCNC: NEGATIVE NG/ML
NORTRAMADOL UR-MCNC: 5088 NG/ML
OH-ETHYLFLURAZ UR-MCNC: NEGATIVE NG/ML
OXAZEPAM UR-MCNC: NEGATIVE NG/ML
OXIDANTS UR QL: NEGATIVE MCG/ML
OXYCODONE UR CFM-MCNC: NEGATIVE NG/ML
OXYMORPHONE UR CFM-MCNC: NEGATIVE NG/ML
PCP UR QL: NEGATIVE NG/ML
PH UR: 5.8 [PH] (ref 4.5–9)
QUEST 6 ACETYLMORPHINE: ABNORMAL
QUEST NOTES AND COMMENTS: ABNORMAL
QUEST PATIENT HISTORICAL REPORT: ABNORMAL
QUEST ZOLPIDEM: ABNORMAL
TEMAZEPAM UR-MCNC: NEGATIVE NG/ML
THC UR QL: POSITIVE NG/ML
THC UR-MCNC: 10 NG/ML
TRAMADOL UR-MCNC: ABNORMAL NG/ML
ZOLPIDEM PHENYL-4-CARB UR CFM-MCNC: ABNORMAL NG/ML

## 2025-08-04 LAB
1OH-MIDAZOLAM UR-MCNC: NEGATIVE NG/ML
7AMINOCLONAZEPAM UR-MCNC: NEGATIVE NG/ML
A-OH ALPRAZ UR-MCNC: NEGATIVE NG/ML
A-OH-TRIAZOLAM UR-MCNC: NEGATIVE NG/ML
AMPHETAMINES UR QL: NEGATIVE NG/ML
BARBITURATES UR QL: NEGATIVE NG/ML
BZE UR QL: NEGATIVE NG/ML
CODEINE UR-MCNC: NEGATIVE NG/ML
CREAT UR-MCNC: 135 MG/DL
DRUG SCREEN COMMENT UR-IMP: ABNORMAL
EDDP UR-MCNC: NEGATIVE NG/ML
FENTANYL UR-MCNC: NEGATIVE NG/ML
HYDROCODONE UR-MCNC: NEGATIVE NG/ML
HYDROMORPHONE UR-MCNC: NEGATIVE NG/ML
LORAZEPAM UR-MCNC: NEGATIVE NG/ML
METHADONE UR-MCNC: NEGATIVE NG/ML
MORPHINE UR-MCNC: NEGATIVE NG/ML
NORDIAZEPAM UR-MCNC: NEGATIVE NG/ML
NORFENTANYL UR-MCNC: NEGATIVE NG/ML
NORHYDROCODONE UR CFM-MCNC: NEGATIVE NG/ML
NOROXYCODONE UR CFM-MCNC: NEGATIVE NG/ML
NORTRAMADOL UR-MCNC: 5088 NG/ML
OH-ETHYLFLURAZ UR-MCNC: NEGATIVE NG/ML
OXAZEPAM UR-MCNC: NEGATIVE NG/ML
OXIDANTS UR QL: NEGATIVE MCG/ML
OXYCODONE UR CFM-MCNC: NEGATIVE NG/ML
OXYMORPHONE UR CFM-MCNC: NEGATIVE NG/ML
PCP UR QL: NEGATIVE NG/ML
PH UR: 5.8 [PH] (ref 4.5–9)
QUEST 6 ACETYLMORPHINE: NEGATIVE NG/ML
QUEST NOTES AND COMMENTS: ABNORMAL
QUEST ZOLPIDEM: NEGATIVE NG/ML
TEMAZEPAM UR-MCNC: NEGATIVE NG/ML
THC UR QL: POSITIVE NG/ML
THC UR-MCNC: 10 NG/ML
TRAMADOL UR-MCNC: ABNORMAL NG/ML
ZOLPIDEM PHENYL-4-CARB UR CFM-MCNC: NEGATIVE NG/ML

## 2025-08-04 ASSESSMENT — ENCOUNTER SYMPTOMS
PSYCHIATRIC NEGATIVE: 1
EYES NEGATIVE: 1
HEMATOLOGIC/LYMPHATIC NEGATIVE: 1
CONSTITUTIONAL NEGATIVE: 1
NEUROLOGICAL NEGATIVE: 1
ALLERGIC/IMMUNOLOGIC NEGATIVE: 1
GASTROINTESTINAL NEGATIVE: 1
RESPIRATORY NEGATIVE: 1
CARDIOVASCULAR NEGATIVE: 1
ENDOCRINE NEGATIVE: 1

## 2025-08-13 ENCOUNTER — APPOINTMENT (OUTPATIENT)
Dept: RADIOLOGY | Facility: HOSPITAL | Age: 70
End: 2025-08-13
Payer: MEDICARE

## 2025-08-19 ENCOUNTER — HOSPITAL ENCOUNTER (OUTPATIENT)
Dept: RADIOLOGY | Facility: CLINIC | Age: 70
Discharge: HOME | End: 2025-08-19
Payer: MEDICARE

## 2025-08-19 DIAGNOSIS — Z87.891 PERSONAL HISTORY OF NICOTINE DEPENDENCE: ICD-10-CM

## 2025-08-19 DIAGNOSIS — Z72.0 TOBACCO ABUSE: ICD-10-CM

## 2025-08-19 PROCEDURE — 71271 CT THORAX LUNG CANCER SCR C-: CPT | Performed by: RADIOLOGY

## 2025-08-19 PROCEDURE — 71271 CT THORAX LUNG CANCER SCR C-: CPT

## 2025-08-25 ENCOUNTER — RESULTS FOLLOW-UP (OUTPATIENT)
Dept: PRIMARY CARE | Facility: CLINIC | Age: 70
End: 2025-08-25
Payer: MEDICARE

## 2025-08-25 DIAGNOSIS — E03.9 ACQUIRED HYPOTHYROIDISM: ICD-10-CM

## 2025-08-26 RX ORDER — LEVOTHYROXINE SODIUM 50 UG/1
50 TABLET ORAL DAILY
Qty: 30 TABLET | Refills: 0 | Status: SHIPPED | OUTPATIENT
Start: 2025-08-26 | End: 2026-02-22

## 2025-09-05 ENCOUNTER — APPOINTMENT (OUTPATIENT)
Dept: PRIMARY CARE | Facility: CLINIC | Age: 70
End: 2025-09-05
Payer: MEDICARE

## 2025-09-05 ASSESSMENT — ACTIVITIES OF DAILY LIVING (ADL)
GROCERY_SHOPPING: INDEPENDENT
DOING_HOUSEWORK: INDEPENDENT
TAKING_MEDICATION: INDEPENDENT
MANAGING_FINANCES: INDEPENDENT
DRESSING: INDEPENDENT
BATHING: INDEPENDENT

## 2025-09-05 ASSESSMENT — ENCOUNTER SYMPTOMS
ACTIVITY CHANGE: 0
DEPRESSION: 0
WHEEZING: 0
FREQUENCY: 0
CHEST TIGHTNESS: 0
BLOOD IN STOOL: 0
OCCASIONAL FEELINGS OF UNSTEADINESS: 0
ABDOMINAL PAIN: 0
SHORTNESS OF BREATH: 0
SLEEP DISTURBANCE: 1
COLOR CHANGE: 0
PALPITATIONS: 0
ARTHRALGIAS: 1
APPETITE CHANGE: 0
DIZZINESS: 0
DIARRHEA: 0
MYALGIAS: 1
NAUSEA: 0
TREMORS: 0
NERVOUS/ANXIOUS: 1
FATIGUE: 1
LIGHT-HEADEDNESS: 0
LOSS OF SENSATION IN FEET: 0
CONSTIPATION: 0

## 2025-09-05 ASSESSMENT — PATIENT HEALTH QUESTIONNAIRE - PHQ9
SUM OF ALL RESPONSES TO PHQ QUESTIONS 1-9: 12
5. POOR APPETITE OR OVEREATING: NEARLY EVERY DAY
1. LITTLE INTEREST OR PLEASURE IN DOING THINGS: NEARLY EVERY DAY
6. FEELING BAD ABOUT YOURSELF - OR THAT YOU ARE A FAILURE OR HAVE LET YOURSELF OR YOUR FAMILY DOWN: NOT AT ALL
8. MOVING OR SPEAKING SO SLOWLY THAT OTHER PEOPLE COULD HAVE NOTICED. OR THE OPPOSITE, BEING SO FIGETY OR RESTLESS THAT YOU HAVE BEEN MOVING AROUND A LOT MORE THAN USUAL: NOT AT ALL
3. TROUBLE FALLING OR STAYING ASLEEP OR SLEEPING TOO MUCH: SEVERAL DAYS
9. THOUGHTS THAT YOU WOULD BE BETTER OFF DEAD, OR OF HURTING YOURSELF: NOT AT ALL
7. TROUBLE CONCENTRATING ON THINGS, SUCH AS READING THE NEWSPAPER OR WATCHING TELEVISION: NOT AT ALL
SUM OF ALL RESPONSES TO PHQ9 QUESTIONS 1 AND 2: 6
2. FEELING DOWN, DEPRESSED OR HOPELESS: NEARLY EVERY DAY
4. FEELING TIRED OR HAVING LITTLE ENERGY: MORE THAN HALF THE DAYS

## 2025-09-05 ASSESSMENT — ANXIETY QUESTIONNAIRES
6. BECOMING EASILY ANNOYED OR IRRITABLE: NEARLY EVERY DAY
4. TROUBLE RELAXING: NEARLY EVERY DAY
7. FEELING AFRAID AS IF SOMETHING AWFUL MIGHT HAPPEN: NOT AT ALL
IF YOU CHECKED OFF ANY PROBLEMS ON THIS QUESTIONNAIRE, HOW DIFFICULT HAVE THESE PROBLEMS MADE IT FOR YOU TO DO YOUR WORK, TAKE CARE OF THINGS AT HOME, OR GET ALONG WITH OTHER PEOPLE: SOMEWHAT DIFFICULT
2. NOT BEING ABLE TO STOP OR CONTROL WORRYING: NEARLY EVERY DAY
GAD7 TOTAL SCORE: 12
1. FEELING NERVOUS, ANXIOUS, OR ON EDGE: NEARLY EVERY DAY
5. BEING SO RESTLESS THAT IT IS HARD TO SIT STILL: NOT AT ALL
3. WORRYING TOO MUCH ABOUT DIFFERENT THINGS: NOT AT ALL

## 2025-09-05 ASSESSMENT — PAIN SCALES - GENERAL: PAINLEVEL_OUTOF10: 0-NO PAIN
